# Patient Record
Sex: MALE | Race: WHITE | NOT HISPANIC OR LATINO | Employment: FULL TIME | URBAN - METROPOLITAN AREA
[De-identification: names, ages, dates, MRNs, and addresses within clinical notes are randomized per-mention and may not be internally consistent; named-entity substitution may affect disease eponyms.]

---

## 2019-08-28 ENCOUNTER — OFFICE VISIT (OUTPATIENT)
Dept: FAMILY MEDICINE CLINIC | Facility: CLINIC | Age: 39
End: 2019-08-28

## 2019-08-28 VITALS
WEIGHT: 187 LBS | DIASTOLIC BLOOD PRESSURE: 84 MMHG | HEART RATE: 63 BPM | BODY MASS INDEX: 24.78 KG/M2 | SYSTOLIC BLOOD PRESSURE: 123 MMHG | HEIGHT: 73 IN | TEMPERATURE: 98.2 F | OXYGEN SATURATION: 97 %

## 2019-08-28 DIAGNOSIS — M25.512 ACUTE PAIN OF LEFT SHOULDER: Primary | ICD-10-CM

## 2019-08-28 DIAGNOSIS — F32.A DEPRESSION, UNSPECIFIED DEPRESSION TYPE: ICD-10-CM

## 2019-08-28 DIAGNOSIS — J45.30 MILD PERSISTENT ASTHMA WITHOUT COMPLICATION: ICD-10-CM

## 2019-08-28 DIAGNOSIS — G25.81 RESTLESS LEG SYNDROME: ICD-10-CM

## 2019-08-28 PROBLEM — J45.909 ASTHMA: Status: ACTIVE | Noted: 2019-08-28

## 2019-08-28 PROCEDURE — 99214 OFFICE O/P EST MOD 30 MIN: CPT | Performed by: FAMILY MEDICINE

## 2019-08-28 RX ORDER — ESCITALOPRAM OXALATE 20 MG/1
20 TABLET ORAL DAILY
Qty: 90 TABLET | Refills: 3 | Status: SHIPPED | OUTPATIENT
Start: 2019-08-28 | End: 2020-09-03 | Stop reason: SDUPTHER

## 2019-08-28 RX ORDER — MONTELUKAST SODIUM 10 MG/1
10 TABLET ORAL NIGHTLY
Qty: 90 TABLET | Refills: 3 | Status: SHIPPED | OUTPATIENT
Start: 2019-08-28 | End: 2020-09-14 | Stop reason: SDUPTHER

## 2019-08-28 RX ORDER — BUPROPION HYDROCHLORIDE 150 MG/1
TABLET ORAL
Qty: 90 TABLET | Refills: 3 | Status: SHIPPED | OUTPATIENT
Start: 2019-08-28 | End: 2020-09-17 | Stop reason: SDUPTHER

## 2019-08-28 RX ORDER — PRAMIPEXOLE DIHYDROCHLORIDE 0.25 MG/1
TABLET ORAL
Qty: 120 TABLET | Refills: 1 | Status: SHIPPED | OUTPATIENT
Start: 2019-08-28 | End: 2020-09-17 | Stop reason: SDUPTHER

## 2019-08-28 NOTE — PROGRESS NOTES
Subjective   Angel Chandra is a 38 y.o. male.     Chief Complaint   Patient presents with   • Asthma   • Depression   • Pain       History of Present Illness     Patient is here to follow-up on depression.  Recently he decreased his bupropion to 150 mg daily but has continued the Lexapro at 20 mg a day.  He is getting the same benefit with the lower dose of bupropion and would like to switch from the 300s to the 150s.  Denies SI or HI.     Patient is also here to follow-up on asthma.  His current symptoms have been poorly controlled past week.  He had one day where he woke up short of breath and had to miss work but with his regular routine medications he was able to gain good symptom relief by about noon that day.  Patient did state that he had not been taking the Advair twice daily but only once daily.    Patient is also here to follow-up on his restless leg syndrome.  He takes Mirapex as needed.  This gives him good symptom control.    Also here because he is having a new problem.  He has been having left shoulder pain for the past 2 years intermittently.  However more recently it has been getting worse especially after golfing or other activities.  Patient has had some symptom relief with muscle relaxers, NSAIDs, and ice.  However his symptoms seem to be worsening.  He had a similar problem in the right shoulder which had to be surgically repaired in 2011.  Patient used to be a  and also played basketball in the past.  Right-hand-dominant.  Denies numbness or tingling down the arm or in the hand.      Review of Systems   Constitutional: Negative for activity change, chills, fatigue and fever.   HENT: Negative for hearing loss, swollen glands, tinnitus and trouble swallowing.    Eyes: Negative for pain and visual disturbance.   Respiratory: Negative for cough and shortness of breath.    Cardiovascular: Negative for chest pain, palpitations and leg swelling.   Gastrointestinal: Negative for diarrhea and  nausea.   Endocrine: Negative for polydipsia and polyuria.   Genitourinary: Negative for difficulty urinating and urinary incontinence.   Musculoskeletal: Positive for arthralgias. Negative for gait problem and joint swelling.   Skin: Negative for rash.   Allergic/Immunologic: Negative for immunocompromised state.   Neurological: Negative for dizziness, light-headedness and headache.   Hematological: Negative for adenopathy. Does not bruise/bleed easily.   Psychiatric/Behavioral: Negative for dysphoric mood and sleep disturbance.       The following portions of the patient's history were reviewed and updated as appropriate: allergies, current medications, past family history, past medical history, past social history, past surgical history and problem list.    Past Medical History:   Diagnosis Date   • Asthma    • Depression    • Restless leg syndrome        History reviewed. No pertinent surgical history.    Family History   Problem Relation Age of Onset   • No Known Problems Mother    • Hypertension Father    • Coronary artery disease Paternal Grandfather    • Alzheimer's disease Paternal Grandfather        Social History     Socioeconomic History   • Marital status:      Spouse name: Not on file   • Number of children: Not on file   • Years of education: Not on file   • Highest education level: Not on file   Tobacco Use   • Smoking status: Never Smoker   • Smokeless tobacco: Never Used   Substance and Sexual Activity   • Alcohol use: Yes     Alcohol/week: 0.6 oz     Types: 1 Standard drinks or equivalent per week   • Drug use: No         Current Outpatient Medications:   •  ADVAIR DISKUS 250-50 MCG/DOSE DISKUS, Inhale 1 puff 2 (Two) Times a Day., Disp: 60 each, Rfl: 6  •  buPROPion XL (WELLBUTRIN XL) 150 MG 24 hr tablet, 1 po daily, Disp: 90 tablet, Rfl: 3  •  cyclobenzaprine (FLEXERIL) 10 MG tablet, 1 every 8 hours as needed. (Patient taking differently: 1 (One) Time As Needed. 1 every 8 hours as  "needed.), Disp: 90 tablet, Rfl: 0  •  escitalopram (LEXAPRO) 20 MG tablet, Take 1 tablet by mouth Daily., Disp: 90 tablet, Rfl: 3  •  montelukast (SINGULAIR) 10 MG tablet, Take 1 tablet by mouth Every Night., Disp: 90 tablet, Rfl: 3  •  pramipexole (MIRAPEX) 0.25 MG tablet, Take 1-2 every evening as needed, Disp: 120 tablet, Rfl: 1    Objective     Vitals:    08/28/19 1612   BP: 123/84   Pulse: 63   Temp: 98.2 °F (36.8 °C)   SpO2: 97%   Weight: 84.8 kg (187 lb)   Height: 185.4 cm (73\")       Body mass index is 24.67 kg/m².    Physical Exam   Constitutional: He is oriented to person, place, and time. He appears well-developed and well-nourished.   HENT:   Head: Normocephalic and atraumatic.   Eyes: Conjunctivae are normal.   Neck: Normal range of motion. Neck supple.   Cardiovascular: Normal rate, regular rhythm, normal heart sounds and intact distal pulses.   Pulmonary/Chest: Effort normal and breath sounds normal.   Abdominal: Soft. Bowel sounds are normal.   Musculoskeletal: Normal range of motion. He exhibits tenderness (Tenderness to palpation in the anterior portion of the left deltoid). He exhibits no edema or deformity.   Movements that bring about the left shoulder pain are reaching behind the patient's back, doing push-ups, or doing pull-ups.  Normal  strength.   Neurological: He is alert and oriented to person, place, and time.   Skin: Skin is warm and dry. Capillary refill takes less than 2 seconds. No rash noted.   Psychiatric: He has a normal mood and affect. His behavior is normal. Judgment and thought content normal.   Nursing note and vitals reviewed.      Procedures    Assessment/Plan   Angel was seen today for asthma, depression and pain.    Diagnoses and all orders for this visit:    Acute pain of left shoulder  -     Ambulatory Referral to Orthopedic Surgery    Mild persistent asthma without complication  -     ADVAIR DISKUS 250-50 MCG/DOSE DISKUS; Inhale 1 puff 2 (Two) Times a Day.  -     " montelukast (SINGULAIR) 10 MG tablet; Take 1 tablet by mouth Every Night.    Restless leg syndrome  -     pramipexole (MIRAPEX) 0.25 MG tablet; Take 1-2 every evening as needed    Depression, unspecified depression type  -     buPROPion XL (WELLBUTRIN XL) 150 MG 24 hr tablet; 1 po daily  -     escitalopram (LEXAPRO) 20 MG tablet; Take 1 tablet by mouth Daily.        Patient Instructions   I have decreased the patient's bupropion to 150 mg daily at his request.  Continue the same dose of Lexapro.  He was advised to return the office in 1 year or sooner as needed for depression.  Patient will continue Advair and montelukast for his asthma.  I encouraged him to use the Advair as directed until his current estimation of asthma is under good control.  Patient will be referred to Dr. Cortes for the evaluation of his left shoulder pain

## 2019-08-28 NOTE — PATIENT INSTRUCTIONS
I have decreased the patient's bupropion to 150 mg daily at his request.  Continue the same dose of Lexapro.  He was advised to return the office in 1 year or sooner as needed for depression.  Patient will continue Advair and montelukast for his asthma.  I encouraged him to use the Advair as directed until his current estimation of asthma is under good control.  Patient will be referred to Dr. Cortes for the evaluation of his left shoulder pain

## 2020-09-03 DIAGNOSIS — J45.30 MILD PERSISTENT ASTHMA WITHOUT COMPLICATION: ICD-10-CM

## 2020-09-03 DIAGNOSIS — F32.A DEPRESSION, UNSPECIFIED DEPRESSION TYPE: ICD-10-CM

## 2020-09-03 RX ORDER — ESCITALOPRAM OXALATE 20 MG/1
20 TABLET ORAL DAILY
Qty: 30 TABLET | Refills: 0 | Status: SHIPPED | OUTPATIENT
Start: 2020-09-03 | End: 2020-09-17 | Stop reason: SDUPTHER

## 2020-09-14 DIAGNOSIS — J45.30 MILD PERSISTENT ASTHMA WITHOUT COMPLICATION: ICD-10-CM

## 2020-09-14 RX ORDER — MONTELUKAST SODIUM 10 MG/1
10 TABLET ORAL NIGHTLY
Qty: 90 TABLET | Refills: 0 | Status: SHIPPED | OUTPATIENT
Start: 2020-09-14 | End: 2020-09-17 | Stop reason: SDUPTHER

## 2020-09-17 ENCOUNTER — OFFICE VISIT (OUTPATIENT)
Dept: FAMILY MEDICINE CLINIC | Facility: CLINIC | Age: 40
End: 2020-09-17

## 2020-09-17 VITALS
BODY MASS INDEX: 24.7 KG/M2 | HEART RATE: 81 BPM | WEIGHT: 186.4 LBS | OXYGEN SATURATION: 98 % | DIASTOLIC BLOOD PRESSURE: 80 MMHG | TEMPERATURE: 97.3 F | HEIGHT: 73 IN | SYSTOLIC BLOOD PRESSURE: 120 MMHG

## 2020-09-17 DIAGNOSIS — F32.A DEPRESSION, UNSPECIFIED DEPRESSION TYPE: ICD-10-CM

## 2020-09-17 DIAGNOSIS — G25.81 RESTLESS LEG SYNDROME: Primary | ICD-10-CM

## 2020-09-17 DIAGNOSIS — J45.30 MILD PERSISTENT ASTHMA WITHOUT COMPLICATION: ICD-10-CM

## 2020-09-17 PROCEDURE — 99214 OFFICE O/P EST MOD 30 MIN: CPT | Performed by: FAMILY MEDICINE

## 2020-09-17 RX ORDER — PRAMIPEXOLE DIHYDROCHLORIDE 0.25 MG/1
TABLET ORAL
Qty: 120 TABLET | Refills: 1 | Status: SHIPPED | OUTPATIENT
Start: 2020-09-17 | End: 2020-12-17

## 2020-09-17 RX ORDER — ESCITALOPRAM OXALATE 20 MG/1
20 TABLET ORAL DAILY
Qty: 90 TABLET | Refills: 3 | Status: SHIPPED | OUTPATIENT
Start: 2020-09-17 | End: 2021-04-07 | Stop reason: SDUPTHER

## 2020-09-17 RX ORDER — MONTELUKAST SODIUM 10 MG/1
10 TABLET ORAL NIGHTLY
Qty: 90 TABLET | Refills: 1 | Status: SHIPPED | OUTPATIENT
Start: 2020-09-17 | End: 2021-04-07 | Stop reason: SDUPTHER

## 2020-09-17 RX ORDER — BUPROPION HYDROCHLORIDE 150 MG/1
TABLET ORAL
Qty: 90 TABLET | Refills: 3 | Status: SHIPPED | OUTPATIENT
Start: 2020-09-17 | End: 2021-04-07 | Stop reason: SDUPTHER

## 2020-09-17 NOTE — PROGRESS NOTES
Subjective  Answers for HPI/ROS submitted by the patient on 9/15/2020   What is the primary reason for your visit?: Other  Please describe your symptoms.: Just checkup.  Have you had these symptoms before?: Yes  How long have you been having these symptoms?: 1-4 days    Angel Chandra is a 40 y.o. male.     Chief Complaint   Patient presents with   • med check       Patient is here to follow-up on depression.  Recently he decreased his bupropion to 150 mg daily but has continued the Lexapro at 20 mg a day.  He is getting the same benefit with the lower dose of bupropion and would like to switch from the 300s to the 150s.  Denies SI or HI.     Patient is also here to follow-up on asthma.  His current symptoms have been poorly controlled past week.  He had one day where he woke up short of breath and had to miss work but with his regular routine medications he was able to gain good symptom relief by about noon that day.  Patient did state that he had not been taking the Advair twice daily but only once daily.    Patient is also here to follow-up on his restless leg syndrome.  He takes Mirapex as needed.  This gives him good symptom control.       Review of Systems   Constitutional: Negative for activity change, chills, fatigue and fever.   HENT: Negative for hearing loss, swollen glands, tinnitus and trouble swallowing.    Eyes: Negative for pain and visual disturbance.   Respiratory: Negative for cough and shortness of breath.    Cardiovascular: Negative for chest pain, palpitations and leg swelling.   Gastrointestinal: Negative for diarrhea and nausea.   Endocrine: Negative for polydipsia and polyuria.   Genitourinary: Negative for difficulty urinating and urinary incontinence.   Musculoskeletal: Negative for arthralgias, gait problem and joint swelling.   Skin: Negative for rash.   Allergic/Immunologic: Negative for immunocompromised state.   Neurological: Negative for dizziness, light-headedness and headache.    Hematological: Negative for adenopathy. Does not bruise/bleed easily.   Psychiatric/Behavioral: Negative for dysphoric mood and sleep disturbance.       The following portions of the patient's history were reviewed and updated as appropriate: allergies, current medications, past family history, past medical history, past social history, past surgical history and problem list.    Past Medical History:   Diagnosis Date   • Asthma    • Depression    • Restless leg syndrome        History reviewed. No pertinent surgical history.    Family History   Problem Relation Age of Onset   • No Known Problems Mother    • Hypertension Father    • Coronary artery disease Paternal Grandfather    • Alzheimer's disease Paternal Grandfather        Social History     Socioeconomic History   • Marital status:      Spouse name: Not on file   • Number of children: Not on file   • Years of education: Not on file   • Highest education level: Not on file   Tobacco Use   • Smoking status: Never Smoker   • Smokeless tobacco: Never Used   Substance and Sexual Activity   • Alcohol use: Yes     Alcohol/week: 1.0 standard drinks     Types: 1 Standard drinks or equivalent per week   • Drug use: No         Current Outpatient Medications:   •  Advair Diskus 250-50 MCG/DOSE DISKUS, Inhale 1 puff 2 (Two) Times a Day., Disp: 60 each, Rfl: 5  •  buPROPion XL (WELLBUTRIN XL) 150 MG 24 hr tablet, 1 po daily, Disp: 90 tablet, Rfl: 3  •  cyclobenzaprine (FLEXERIL) 10 MG tablet, 1 every 8 hours as needed. (Patient taking differently: 1 (One) Time As Needed. 1 every 8 hours as needed.), Disp: 90 tablet, Rfl: 0  •  escitalopram (LEXAPRO) 20 MG tablet, Take 1 tablet by mouth Daily., Disp: 90 tablet, Rfl: 3  •  montelukast (SINGULAIR) 10 MG tablet, Take 1 tablet by mouth Every Night., Disp: 90 tablet, Rfl: 1  •  pramipexole (MIRAPEX) 0.25 MG tablet, Take 1-2 every evening as needed, Disp: 120 tablet, Rfl: 1    Objective     Vitals:    09/17/20 1258   BP:  "120/80   Pulse: 81   Temp: 97.3 °F (36.3 °C)   SpO2: 98%   Weight: 84.6 kg (186 lb 6.4 oz)   Height: 185.4 cm (73\")       Body mass index is 24.59 kg/m².    No components found for: 2D    Physical Exam  Vitals signs and nursing note reviewed.   Constitutional:       Appearance: He is well-developed.   HENT:      Head: Normocephalic and atraumatic.      Right Ear: External ear normal.      Left Ear: External ear normal.      Nose: Nose normal.   Eyes:      General: No scleral icterus.     Conjunctiva/sclera: Conjunctivae normal.   Neck:      Musculoskeletal: Normal range of motion and neck supple.      Vascular: No JVD.   Cardiovascular:      Rate and Rhythm: Normal rate and regular rhythm.      Pulses: Normal pulses.      Heart sounds: Normal heart sounds. No murmur.   Pulmonary:      Effort: Pulmonary effort is normal.      Breath sounds: Normal breath sounds.   Abdominal:      General: Bowel sounds are normal.      Palpations: Abdomen is soft.   Musculoskeletal: Normal range of motion.   Lymphadenopathy:      Cervical: No cervical adenopathy.   Skin:     General: Skin is warm.      Capillary Refill: Capillary refill takes less than 2 seconds.   Neurological:      General: No focal deficit present.      Mental Status: He is alert and oriented to person, place, and time.   Psychiatric:         Behavior: Behavior normal.         Thought Content: Thought content normal.         Judgment: Judgment normal.       Procedures    Assessment/Plan   Angel was seen today for med check.    Diagnoses and all orders for this visit:    Restless leg syndrome  -     pramipexole (MIRAPEX) 0.25 MG tablet; Take 1-2 every evening as needed    Depression, unspecified depression type  -     buPROPion XL (WELLBUTRIN XL) 150 MG 24 hr tablet; 1 po daily  -     escitalopram (LEXAPRO) 20 MG tablet; Take 1 tablet by mouth Daily.    Mild persistent asthma without complication  -     Advair Diskus 250-50 MCG/DOSE DISKUS; Inhale 1 puff 2 (Two) " Times a Day.  -     montelukast (SINGULAIR) 10 MG tablet; Take 1 tablet by mouth Every Night.        There are no Patient Instructions on file for this visit.

## 2020-10-02 ENCOUNTER — TELEPHONE (OUTPATIENT)
Dept: FAMILY MEDICINE CLINIC | Facility: CLINIC | Age: 40
End: 2020-10-02

## 2020-10-02 NOTE — TELEPHONE ENCOUNTER
Angel Dao is not considered “high risk” in the current CDC classification for people who are at “high risk” of complications of COVID 19. Moderate and Severe cases of Asthma are but his classification is MILD persistent asthma which is not currently classified as “high risk”

## 2020-10-02 NOTE — TELEPHONE ENCOUNTER
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 HE WANTS TO KNOW IF HE IS HIGH RISK WITH ASTHMA  AND HIS  HISTORY OF PNEUMONIA WHICH HE HAS HAD 6 TIMES.    PLEASE CALL AND ADVISE [phone]

## 2020-12-17 DIAGNOSIS — G25.81 RESTLESS LEG SYNDROME: ICD-10-CM

## 2020-12-17 RX ORDER — PRAMIPEXOLE DIHYDROCHLORIDE 0.25 MG/1
TABLET ORAL
Qty: 180 TABLET | Refills: 0 | Status: SHIPPED | OUTPATIENT
Start: 2020-12-17 | End: 2021-03-16

## 2021-03-16 DIAGNOSIS — G25.81 RESTLESS LEG SYNDROME: ICD-10-CM

## 2021-03-16 RX ORDER — PRAMIPEXOLE DIHYDROCHLORIDE 0.25 MG/1
TABLET ORAL
Qty: 180 TABLET | Refills: 0 | Status: SHIPPED | OUTPATIENT
Start: 2021-03-16 | End: 2022-07-25

## 2021-04-02 ENCOUNTER — BULK ORDERING (OUTPATIENT)
Dept: CASE MANAGEMENT | Facility: OTHER | Age: 41
End: 2021-04-02

## 2021-04-02 DIAGNOSIS — Z23 IMMUNIZATION DUE: ICD-10-CM

## 2021-04-07 ENCOUNTER — OFFICE VISIT (OUTPATIENT)
Dept: FAMILY MEDICINE CLINIC | Facility: CLINIC | Age: 41
End: 2021-04-07

## 2021-04-07 VITALS
BODY MASS INDEX: 25.21 KG/M2 | HEIGHT: 73 IN | TEMPERATURE: 98.2 F | SYSTOLIC BLOOD PRESSURE: 118 MMHG | HEART RATE: 60 BPM | OXYGEN SATURATION: 98 % | DIASTOLIC BLOOD PRESSURE: 70 MMHG | WEIGHT: 190.2 LBS

## 2021-04-07 DIAGNOSIS — J45.30 MILD PERSISTENT ASTHMA WITHOUT COMPLICATION: Chronic | ICD-10-CM

## 2021-04-07 DIAGNOSIS — G25.81 RESTLESS LEG SYNDROME: Chronic | ICD-10-CM

## 2021-04-07 DIAGNOSIS — Z13.6 ENCOUNTER FOR LIPID SCREENING FOR CARDIOVASCULAR DISEASE: ICD-10-CM

## 2021-04-07 DIAGNOSIS — Z13.1 ENCOUNTER FOR SCREENING FOR DIABETES MELLITUS: ICD-10-CM

## 2021-04-07 DIAGNOSIS — F32.A DEPRESSION, UNSPECIFIED DEPRESSION TYPE: Primary | Chronic | ICD-10-CM

## 2021-04-07 DIAGNOSIS — Z00.00 ENCOUNTER FOR WELLNESS EXAMINATION IN ADULT: ICD-10-CM

## 2021-04-07 DIAGNOSIS — Z13.220 ENCOUNTER FOR LIPID SCREENING FOR CARDIOVASCULAR DISEASE: ICD-10-CM

## 2021-04-07 PROCEDURE — 99396 PREV VISIT EST AGE 40-64: CPT | Performed by: FAMILY MEDICINE

## 2021-04-07 RX ORDER — LORATADINE 10 MG/1
10 TABLET ORAL DAILY
COMMUNITY

## 2021-04-07 RX ORDER — ESCITALOPRAM OXALATE 20 MG/1
20 TABLET ORAL DAILY
Qty: 90 TABLET | Refills: 3 | Status: SHIPPED | OUTPATIENT
Start: 2021-04-07 | End: 2022-02-09 | Stop reason: SDUPTHER

## 2021-04-07 RX ORDER — MONTELUKAST SODIUM 10 MG/1
10 TABLET ORAL NIGHTLY
Qty: 90 TABLET | Refills: 1 | Status: SHIPPED | OUTPATIENT
Start: 2021-04-07 | End: 2021-11-01

## 2021-04-07 RX ORDER — BUPROPION HYDROCHLORIDE 150 MG/1
TABLET ORAL
Qty: 90 TABLET | Refills: 3 | Status: SHIPPED | OUTPATIENT
Start: 2021-04-07 | End: 2022-02-09

## 2021-04-07 NOTE — PROGRESS NOTES
Subjective   Angel Chandra is a 40 y.o. male who presents for annual male wellness exam.  Chief Complaint   Patient presents with   • Annual Exam      Patient is also here to follow-up on depression.  Recently he decreased his bupropion to 150 mg daily but has continued the Lexapro at 20 mg a day.  He feels his mood is sometimes flat but otherwise he feels it is better than when he is not taking the meds. Denies SI or HI.     Patient is also here to follow-up on asthma.  Patient is taking Advair twice daily now.  He is also taking montelukast 10 mg daily and loratadine for seasonal allergy symptoms that tend to promote his asthma symptoms.    Patient is also here to follow-up on his restless leg syndrome.  He takes Mirapex as needed.  This gives him good symptom control.    Sexual History: Monogamous female partner for the past 20 years  Contraception: Partner with IUD  Diet: Healthy  Exercise: Not really exercising regularily  Do you feel safe? Yes  Have you ever been abused? No  Last dental exam: 6 months ago  Eye exam: 1 year ago    Colon Cancer Screening: NA  PSA: NA        There is no immunization history on file for this patient.    The following portions of the patient's history were reviewed and updated as appropriate: allergies, current medications, past family history, past medical history, past social history, past surgical history and problem list.    Past Medical History:   Diagnosis Date   • Asthma    • Depression    • Restless leg syndrome        History reviewed. No pertinent surgical history.    Family History   Problem Relation Age of Onset   • No Known Problems Mother    • Hypertension Father    • Coronary artery disease Paternal Grandfather    • Alzheimer's disease Paternal Grandfather        Social History     Socioeconomic History   • Marital status:      Spouse name: Not on file   • Number of children: Not on file   • Years of education: Not on file   • Highest education level: Not on  file   Tobacco Use   • Smoking status: Never Smoker   • Smokeless tobacco: Never Used   Substance and Sexual Activity   • Alcohol use: Yes     Alcohol/week: 1.0 standard drinks     Types: 1 Standard drinks or equivalent per week   • Drug use: No         Current Outpatient Medications:   •  Advair Diskus 250-50 MCG/DOSE DISKUS, Inhale 1 puff 2 (Two) Times a Day., Disp: 60 each, Rfl: 5  •  buPROPion XL (WELLBUTRIN XL) 150 MG 24 hr tablet, 1 po daily, Disp: 90 tablet, Rfl: 3  •  cyclobenzaprine (FLEXERIL) 10 MG tablet, 1 every 8 hours as needed. (Patient taking differently: 1 (One) Time As Needed. 1 every 8 hours as needed.), Disp: 90 tablet, Rfl: 0  •  escitalopram (LEXAPRO) 20 MG tablet, Take 1 tablet by mouth Daily., Disp: 90 tablet, Rfl: 3  •  loratadine (CLARITIN) 10 MG tablet, Take 10 mg by mouth Daily., Disp: , Rfl:   •  montelukast (SINGULAIR) 10 MG tablet, Take 1 tablet by mouth Every Night., Disp: 90 tablet, Rfl: 1  •  pramipexole (MIRAPEX) 0.25 MG tablet, TAKE 1-2 EVERY EVENING AS NEEDED, Disp: 180 tablet, Rfl: 0    Review of Systems    Objective   Vitals:    04/07/21 1426   BP: 118/70   Pulse: 60   Temp: 98.2 °F (36.8 °C)   SpO2: 98%     Body mass index is 25.1 kg/m².  Physical Exam  Vitals and nursing note reviewed.   Constitutional:       Appearance: Normal appearance. He is well-developed.   HENT:      Head: Normocephalic and atraumatic.   Eyes:      General: No scleral icterus.     Conjunctiva/sclera: Conjunctivae normal.   Cardiovascular:      Rate and Rhythm: Normal rate and regular rhythm.      Heart sounds: Normal heart sounds.   Pulmonary:      Effort: Pulmonary effort is normal.      Breath sounds: Normal breath sounds.   Abdominal:      General: Bowel sounds are normal.      Palpations: Abdomen is soft.   Musculoskeletal:         General: Normal range of motion.      Cervical back: Normal range of motion and neck supple.   Skin:     General: Skin is warm and dry.      Capillary Refill: Capillary  refill takes less than 2 seconds.      Findings: No rash.   Neurological:      General: No focal deficit present.      Mental Status: He is alert and oriented to person, place, and time.   Psychiatric:         Mood and Affect: Mood normal.         Behavior: Behavior normal.         Thought Content: Thought content normal.         Judgment: Judgment normal.           Assessment/Plan   Diagnoses and all orders for this visit:    1. Depression, unspecified depression type (Primary)  -     buPROPion XL (WELLBUTRIN XL) 150 MG 24 hr tablet; 1 po daily  Dispense: 90 tablet; Refill: 3  -     escitalopram (LEXAPRO) 20 MG tablet; Take 1 tablet by mouth Daily.  Dispense: 90 tablet; Refill: 3    2. Restless leg syndrome    3. Mild persistent asthma without complication  -     Discontinue: Advair Diskus 250-50 MCG/DOSE DISKUS; Inhale 1 puff 2 (Two) Times a Day.  Dispense: 60 each; Refill: 5  -     Advair Diskus 250-50 MCG/DOSE DISKUS; Inhale 1 puff 2 (Two) Times a Day.  Dispense: 60 each; Refill: 5  -     montelukast (SINGULAIR) 10 MG tablet; Take 1 tablet by mouth Every Night.  Dispense: 90 tablet; Refill: 1    4. Encounter for wellness examination in adult    5. Encounter for lipid screening for cardiovascular disease  -     Lipid Panel    6. Encounter for screening for diabetes mellitus  -     Comprehensive Metabolic Panel        Discussed the importance of maintaining a healthy weight and getting regular exercise.  Educated patient on the benefits of healthy diet.  Advise follow-up annually for wellness exams.    There are no Patient Instructions on file for this visit.

## 2021-04-08 LAB
ALBUMIN SERPL-MCNC: 4.7 G/DL (ref 4–5)
ALBUMIN/GLOB SERPL: 2 {RATIO} (ref 1.2–2.2)
ALP SERPL-CCNC: 67 IU/L (ref 39–117)
ALT SERPL-CCNC: 28 IU/L (ref 0–44)
AST SERPL-CCNC: 19 IU/L (ref 0–40)
BILIRUB SERPL-MCNC: 0.5 MG/DL (ref 0–1.2)
BUN SERPL-MCNC: 14 MG/DL (ref 6–24)
BUN/CREAT SERPL: 12 (ref 9–20)
CALCIUM SERPL-MCNC: 9.4 MG/DL (ref 8.7–10.2)
CHLORIDE SERPL-SCNC: 103 MMOL/L (ref 96–106)
CHOLEST SERPL-MCNC: 288 MG/DL (ref 100–199)
CO2 SERPL-SCNC: 22 MMOL/L (ref 20–29)
CREAT SERPL-MCNC: 1.14 MG/DL (ref 0.76–1.27)
GLOBULIN SER CALC-MCNC: 2.4 G/DL (ref 1.5–4.5)
GLUCOSE SERPL-MCNC: 84 MG/DL (ref 65–99)
HDLC SERPL-MCNC: 37 MG/DL
LDLC SERPL CALC-MCNC: 197 MG/DL (ref 0–99)
POTASSIUM SERPL-SCNC: 4.7 MMOL/L (ref 3.5–5.2)
PROT SERPL-MCNC: 7.1 G/DL (ref 6–8.5)
SODIUM SERPL-SCNC: 140 MMOL/L (ref 134–144)
TRIGL SERPL-MCNC: 272 MG/DL (ref 0–149)
VLDLC SERPL CALC-MCNC: 54 MG/DL (ref 5–40)

## 2021-08-18 ENCOUNTER — TELEPHONE (OUTPATIENT)
Dept: FAMILY MEDICINE CLINIC | Facility: CLINIC | Age: 41
End: 2021-08-18

## 2021-08-18 NOTE — TELEPHONE ENCOUNTER
I called pt back and told him we usually advise OTC meds for pain ibuprofen/tylenol, decongestants if needed, drink plenty of water. If any chest pain or soa go to ER. If you want me to add anything let me know and I can call him back

## 2021-08-18 NOTE — TELEPHONE ENCOUNTER
Caller: Prateek Angel    Relationship: Self    Best call back number: [phone]     What medication are you requesting: COVID POSITIVE PATIENT- SOMETHING TO HELP WITH SYMPTOMS     What are your current symptoms: BAD NASAL CONGESTION, TERRIBLE BODY AND MUSCLE ACHES     How long have you been experiencing symptoms: JUST STARTED TODAY     Have you had these symptoms before:  [] Yes  [x] No    Have you been treated for these symptoms before:  [] Yes  [x] No    If a prescription is needed, what is your preferred pharmacy and phone number:        [facility]/pharmacy #[id number] - [place], [place] - [address] [phone] [facility] [phone]   [phone]    Additional notes:    PATIENT WOKE UP THIS MORNING FEELING TERRIBLE. HE WORKS IN "[employer] AND ENDED UP GETTING TESTED FOR COVID THIS MORNING AT THE SCHOOL. HE IS POSITIVE.   HE IS OUT OF WORK TIL AUGUST 30 TH.

## 2021-10-31 DIAGNOSIS — J45.30 MILD PERSISTENT ASTHMA WITHOUT COMPLICATION: Chronic | ICD-10-CM

## 2021-11-01 RX ORDER — MONTELUKAST SODIUM 10 MG/1
TABLET ORAL
Qty: 90 TABLET | Refills: 1 | Status: SHIPPED | OUTPATIENT
Start: 2021-11-01 | End: 2022-05-06

## 2022-02-09 ENCOUNTER — OFFICE VISIT (OUTPATIENT)
Dept: FAMILY MEDICINE CLINIC | Facility: CLINIC | Age: 42
End: 2022-02-09

## 2022-02-09 VITALS
WEIGHT: 189.2 LBS | HEART RATE: 67 BPM | DIASTOLIC BLOOD PRESSURE: 72 MMHG | HEIGHT: 73 IN | BODY MASS INDEX: 25.08 KG/M2 | TEMPERATURE: 97.7 F | OXYGEN SATURATION: 97 % | SYSTOLIC BLOOD PRESSURE: 108 MMHG

## 2022-02-09 DIAGNOSIS — F43.23 ADJUSTMENT DISORDER WITH MIXED ANXIETY AND DEPRESSED MOOD: Primary | ICD-10-CM

## 2022-02-09 DIAGNOSIS — F41.0 PANIC ATTACKS: ICD-10-CM

## 2022-02-09 DIAGNOSIS — F32.A DEPRESSION, UNSPECIFIED DEPRESSION TYPE: Chronic | ICD-10-CM

## 2022-02-09 PROCEDURE — 99214 OFFICE O/P EST MOD 30 MIN: CPT | Performed by: FAMILY MEDICINE

## 2022-02-09 RX ORDER — ESCITALOPRAM OXALATE 20 MG/1
20 TABLET ORAL DAILY
Qty: 90 TABLET | Refills: 1 | Status: SHIPPED | OUTPATIENT
Start: 2022-02-09 | End: 2022-06-03 | Stop reason: SDUPTHER

## 2022-02-09 RX ORDER — HYDROXYZINE HYDROCHLORIDE 25 MG/1
25 TABLET, FILM COATED ORAL EVERY 8 HOURS PRN
Qty: 30 TABLET | Refills: 0 | Status: SHIPPED | OUTPATIENT
Start: 2022-02-09

## 2022-02-09 NOTE — PROGRESS NOTES
"Chief Complaint  Insomnia (worried he has pneumonia because wife has it. )    Subjective          Angel Chandra presents to [facility]  Patient is here today to discuss any symptoms he is having.  He recently had an upper respiratory infection a few weeks ago and then his wife tested positive for COVID after that.  He never was tested.  He did improve with his upper respiratory symptoms but has had issues with insomnia for about 3 weeks.  He stopped his antidepressants about 10 weeks ago.  He states that he is feeling dizzy intermittently.  He still has an intermittent cough.  Some episodes of shortness of breath.  He does have a history of panic attacks and admits that some of this could be anxiety.  Patient denies any chest pain or palpitations other than when he gets very anxious he does feel his heart racing.      Objective   Vital Signs:   /72   Pulse 67   Temp 97.7 °F (36.5 °C)   Ht 185.4 cm (72.99\")   Wt 85.8 kg (189 lb 3.2 oz)   SpO2 97%   BMI 24.97 kg/m²     Physical Exam  Vitals and nursing note reviewed.   Constitutional:       Appearance: Normal appearance. He is well-developed and normal weight.   HENT:      Head: Normocephalic and atraumatic.   Eyes:      General: No scleral icterus.     Conjunctiva/sclera: Conjunctivae normal.   Cardiovascular:      Rate and Rhythm: Normal rate and regular rhythm.      Heart sounds: Normal heart sounds.   Pulmonary:      Effort: Pulmonary effort is normal.      Breath sounds: Normal breath sounds. No wheezing, rhonchi or rales.   Musculoskeletal:         General: Normal range of motion.      Cervical back: Normal range of motion and neck supple.      Right lower leg: No edema.      Left lower leg: No edema.   Skin:     General: Skin is warm and dry.      Capillary Refill: Capillary refill takes less than 2 seconds.      Findings: No rash.   Neurological:      Mental Status: He is alert and oriented to person, place, and time. "   Psychiatric:         Mood and Affect: Mood normal.         Behavior: Behavior normal.         Thought Content: Thought content normal.         Judgment: Judgment normal.        Result Review :                 Assessment and Plan    Diagnoses and all orders for this visit:    1. Adjustment disorder with mixed anxiety and depressed mood (Primary)    2. Panic attacks  -     hydrOXYzine (ATARAX) 25 MG tablet; Take 1 tablet by mouth Every 8 (Eight) Hours As Needed for Anxiety.  Dispense: 30 tablet; Refill: 0    3. Depression, unspecified depression type  -     escitalopram (LEXAPRO) 20 MG tablet; Take 1 tablet by mouth Daily.  Dispense: 90 tablet; Refill: 1    Clinically the patient's vitals are all stable and his physical exam does not support a diagnosis of pneumonia.  His symptoms seem much more in keeping with uncontrolled anxiety and panic disorder.  I would like him to restart Lexapro at 10 mg daily and use hydroxyzine as needed for sleep or panic attacks.  He should stay at that dosage for at least a week and he may increase it to a 20 mg dose after that if needed.  If his symptoms are worsening or if he develops any new symptoms he should seek more urgent medical care.  Otherwise he should follow-up in 3 months or sooner for persistent symptoms.      Follow Up   Return in about 3 months (around 5/9/2022) for Anxiety.  Patient was given instructions and counseling regarding his condition or for health maintenance advice. Please see specific information pulled into the AVS if appropriate.

## 2022-04-15 ENCOUNTER — OFFICE VISIT (OUTPATIENT)
Dept: FAMILY MEDICINE CLINIC | Facility: CLINIC | Age: 42
End: 2022-04-15

## 2022-04-15 VITALS
DIASTOLIC BLOOD PRESSURE: 62 MMHG | OXYGEN SATURATION: 97 % | HEART RATE: 78 BPM | SYSTOLIC BLOOD PRESSURE: 116 MMHG | BODY MASS INDEX: 24.65 KG/M2 | WEIGHT: 186 LBS | HEIGHT: 73 IN | TEMPERATURE: 96.1 F

## 2022-04-15 DIAGNOSIS — Z12.11 SCREENING FOR COLON CANCER: ICD-10-CM

## 2022-04-15 DIAGNOSIS — R06.02 SHORTNESS OF BREATH: Primary | ICD-10-CM

## 2022-04-15 DIAGNOSIS — J45.41 MODERATE PERSISTENT ASTHMA WITH EXACERBATION: ICD-10-CM

## 2022-04-15 LAB
EXPIRATION DATE: NORMAL
FLUAV AG NPH QL: NEGATIVE
FLUBV AG NPH QL: NEGATIVE
INTERNAL CONTROL: NORMAL
Lab: NORMAL

## 2022-04-15 PROCEDURE — 99214 OFFICE O/P EST MOD 30 MIN: CPT | Performed by: NURSE PRACTITIONER

## 2022-04-15 PROCEDURE — 87804 INFLUENZA ASSAY W/OPTIC: CPT | Performed by: NURSE PRACTITIONER

## 2022-04-15 RX ORDER — METHYLPREDNISOLONE 4 MG/1
TABLET ORAL
Qty: 21 TABLET | Refills: 0 | Status: SHIPPED | OUTPATIENT
Start: 2022-04-15 | End: 2022-06-03

## 2022-04-15 RX ORDER — LEVOFLOXACIN 500 MG/1
500 TABLET, FILM COATED ORAL DAILY
Qty: 7 TABLET | Refills: 0 | Status: SHIPPED | OUTPATIENT
Start: 2022-04-15 | End: 2022-06-03

## 2022-04-15 RX ORDER — ALBUTEROL SULFATE 90 UG/1
2 AEROSOL, METERED RESPIRATORY (INHALATION) EVERY 4 HOURS PRN
Qty: 18 G | Refills: 2 | Status: SHIPPED | OUTPATIENT
Start: 2022-04-15

## 2022-04-15 NOTE — PROGRESS NOTES
"Chief Complaint  Shortness of Breath, Cough, and Fever    Subjective          Angel Chandra presents to [facility]  History of Present Illness     Patient is a patient of Dr. Yessica Oneil.  This my first time seeing this patient.  He presents today with complaint of cough and shortness of air since Wednesday.  States symptoms got worse on Thursday.  Got exposed to allergens then.      Reports fever  Denies  increasing nasal congestion, ear pain or pressure, nausea, vomiting, diarrhea, sore throat,  loss of sense of taste or smell.    OTC: advair and singular, uses rescue inhaler since being sick.      Also asking for GI referral for colonoscopy.  Had adenoma 8 years ago on colonoscopy    Objective   Vital Signs:   /62   Pulse 78   Temp 96.1 °F (35.6 °C)   Ht 185.4 cm (72.99\")   Wt 84.4 kg (186 lb)   SpO2 97%   BMI 24.55 kg/m²     BMI is within normal parameters. No follow-up required.      Physical Exam  Constitutional:       Appearance: Normal appearance.   Cardiovascular:      Rate and Rhythm: Normal rate and regular rhythm.   Pulmonary:      Effort: Pulmonary effort is normal.      Breath sounds: Decreased air movement present.   Neurological:      Mental Status: He is alert and oriented to person, place, and time.   Psychiatric:         Mood and Affect: Mood normal.         Behavior: Behavior normal.         Thought Content: Thought content normal.         Judgment: Judgment normal.        Result Review :     Influenza A&B    Common Labsle 4/15/22   Rapid Influenza A Ag Negative   Rapid Influenza B Ag Negative                     Assessment and Plan    Diagnoses and all orders for this visit:    1. Shortness of breath (Primary)  -     POC Influenza A / B  -     COVID-19,[facility] ROUTINE, NP/OP SWAB IN TRANSPORT MEDIA OR ESWAB 72 HR TAT - Swab, Oropharynx    2. Moderate persistent asthma with exacerbation    3. Screening for colon cancer  -     Ambulatory Referral to " Gastroenterology    Other orders  -     methylPREDNISolone (MEDROL) 4 MG dose pack; Take as directed on package instructions.  Dispense: 21 tablet; Refill: 0  -     levoFLOXacin (Levaquin) 500 MG tablet; Take 1 tablet by mouth Daily.  Dispense: 7 tablet; Refill: 0  -     albuterol sulfate  (90 Base) MCG/ACT inhaler; Inhale 2 puffs Every 4 (Four) Hours As Needed for Wheezing.  Dispense: 18 g; Refill: 2      Treat for asthma exacerbation.  Refill of albuterol given.  Follow-up if no improvement.    Referral to gastroenterology made for screening colonoscopy.      Follow Up   No follow-ups on file.  Patient was given instructions and counseling regarding his condition or for health maintenance advice. Please see specific information pulled into the AVS if appropriate.

## 2022-04-16 LAB
LABCORP SARS-COV-2, NAA 2 DAY TAT: NORMAL
SARS-COV-2 RNA RESP QL NAA+PROBE: NOT DETECTED

## 2022-05-06 DIAGNOSIS — J45.30 MILD PERSISTENT ASTHMA WITHOUT COMPLICATION: Chronic | ICD-10-CM

## 2022-05-07 RX ORDER — MONTELUKAST SODIUM 10 MG/1
TABLET ORAL
Qty: 90 TABLET | Refills: 0 | Status: SHIPPED | OUTPATIENT
Start: 2022-05-07 | End: 2022-08-03

## 2022-06-03 ENCOUNTER — OFFICE VISIT (OUTPATIENT)
Dept: FAMILY MEDICINE CLINIC | Facility: CLINIC | Age: 42
End: 2022-06-03

## 2022-06-03 VITALS
HEIGHT: 73 IN | HEART RATE: 64 BPM | OXYGEN SATURATION: 98 % | BODY MASS INDEX: 24.6 KG/M2 | WEIGHT: 185.6 LBS | TEMPERATURE: 98.4 F | SYSTOLIC BLOOD PRESSURE: 100 MMHG | DIASTOLIC BLOOD PRESSURE: 70 MMHG

## 2022-06-03 DIAGNOSIS — G89.29 CHRONIC PAIN OF RIGHT KNEE: ICD-10-CM

## 2022-06-03 DIAGNOSIS — F32.A DEPRESSION, UNSPECIFIED DEPRESSION TYPE: Primary | Chronic | ICD-10-CM

## 2022-06-03 DIAGNOSIS — M25.561 CHRONIC PAIN OF RIGHT KNEE: ICD-10-CM

## 2022-06-03 DIAGNOSIS — D48.5 NEOPLASM OF UNCERTAIN BEHAVIOR OF SKIN: ICD-10-CM

## 2022-06-03 PROCEDURE — 99213 OFFICE O/P EST LOW 20 MIN: CPT | Performed by: FAMILY MEDICINE

## 2022-06-03 RX ORDER — ESCITALOPRAM OXALATE 20 MG/1
20 TABLET ORAL DAILY
Qty: 90 TABLET | Refills: 1 | Status: SHIPPED | OUTPATIENT
Start: 2022-06-03 | End: 2023-02-09

## 2022-06-03 NOTE — PROGRESS NOTES
"Chief Complaint  Anxiety    Subjective        Angel Chandra presents to [facility]  Patient is here today to follow-up on anxiety.  He is currently taking Lexapro 20 mg daily.  He states that his anxiety has been well controlled.  He also admits that on occasion he does take a half of a CBD gummy which also helps him shut down his brain.  He thinks that he could probably do better on a lower dose of Lexapro at this time and requests to decrease to 10 mg daily.  The last time he used hydroxyzine it was a month ago.  He has only used it a couple of times since the beginning.  It was beneficial when it was used.  No side effects of either.      Patient also has a couple of moles that he would like to have dermatology look at.  He has seen dermatology in the past.  He states that these moles have changed or are new.    Patient has a history of a right torn meniscus many years ago.  He states that he has suffered from right knee pain since that time.  He feels that lately it has been getting worse and he would like to see orthopedic surgery.  He has seen Dr. Cortes in the past for a shoulder issue.      Objective   Vital Signs:  /70   Pulse 64   Temp 98.4 °F (36.9 °C)   Ht 185.4 cm (72.99\")   Wt 84.2 kg (185 lb 9.6 oz)   SpO2 98%   BMI 24.49 kg/m²     BMI is within normal parameters. No other follow-up for BMI required.      Physical Exam  Vitals and nursing note reviewed.   Constitutional:       Appearance: Normal appearance. He is well-developed and normal weight.   HENT:      Head: Normocephalic and atraumatic.   Eyes:      General: No scleral icterus.     Conjunctiva/sclera: Conjunctivae normal.   Cardiovascular:      Rate and Rhythm: Normal rate and regular rhythm.      Heart sounds: Normal heart sounds.   Pulmonary:      Effort: Pulmonary effort is normal.      Breath sounds: Normal breath sounds.   Abdominal:      General: Bowel sounds are normal.      Palpations: Abdomen " is soft.   Musculoskeletal:         General: Normal range of motion.      Cervical back: Normal range of motion and neck supple.      Right lower leg: No edema.      Left lower leg: No edema.   Skin:     General: Skin is warm and dry.      Capillary Refill: Capillary refill takes less than 2 seconds.      Findings: Lesion (5 mm irregular brownish-tan papule on the posterior right lower extremity.  1 mm black macule located on the right areola) present. No rash.   Neurological:      General: No focal deficit present.      Mental Status: He is alert and oriented to person, place, and time.      Sensory: No sensory deficit.      Motor: No weakness.      Coordination: Coordination normal.      Gait: Gait normal.      Deep Tendon Reflexes: Reflexes normal.   Psychiatric:         Mood and Affect: Mood normal.         Behavior: Behavior normal.         Thought Content: Thought content normal.         Judgment: Judgment normal.        Result Review :  The following data was reviewed by: Yessica Oneil DO on 06/03/2022:                    Assessment and Plan   Diagnoses and all orders for this visit:    1. Depression, unspecified depression type (Primary)  -     escitalopram (LEXAPRO) 20 MG tablet; Take 1 tablet by mouth Daily.  Dispense: 90 tablet; Refill: 1    2. Neoplasm of uncertain behavior of skin  -     Ambulatory Referral to Dermatology    3. Chronic pain of right knee  -     Ambulatory Referral to Orthopedic Surgery    Patient is here today to follow-up on chronic stable depression.  Patient would prefer to continue cutting the Lexapro 20 mg tablets in half and just taking 10 mg daily.  Refill was sent to the pharmacy.    Knee pain. referral to Ortho.    Changing moles.  Referral to dermatologist         Follow Up   Return for Annual physical.  Patient was given instructions and counseling regarding his condition or for health maintenance advice. Please see specific information pulled into the AVS if appropriate.

## 2022-07-25 ENCOUNTER — OFFICE VISIT (OUTPATIENT)
Dept: FAMILY MEDICINE CLINIC | Facility: CLINIC | Age: 42
End: 2022-07-25

## 2022-07-25 VITALS
WEIGHT: 180.6 LBS | HEART RATE: 68 BPM | BODY MASS INDEX: 23.94 KG/M2 | TEMPERATURE: 97.8 F | OXYGEN SATURATION: 99 % | SYSTOLIC BLOOD PRESSURE: 108 MMHG | HEIGHT: 73 IN | DIASTOLIC BLOOD PRESSURE: 82 MMHG

## 2022-07-25 DIAGNOSIS — Z98.890 BACK PAIN WITH HISTORY OF SPINAL SURGERY: ICD-10-CM

## 2022-07-25 DIAGNOSIS — M54.9 BACK PAIN WITH HISTORY OF SPINAL SURGERY: ICD-10-CM

## 2022-07-25 DIAGNOSIS — M54.50 ACUTE RIGHT-SIDED LOW BACK PAIN WITHOUT SCIATICA: Primary | ICD-10-CM

## 2022-07-25 DIAGNOSIS — R29.3 ABNORMAL POSTURE: ICD-10-CM

## 2022-07-25 PROCEDURE — 99213 OFFICE O/P EST LOW 20 MIN: CPT | Performed by: FAMILY MEDICINE

## 2022-07-25 NOTE — PROGRESS NOTES
Chief Complaint  Hospital Follow Up Visit ([id number] [facility] /Pinched nerve in back believes it happened during tennis )    Subjective        Angel Chandra presents to [facility]  History of Present Illness     The patient presents in clinic today for a follow-up hospital ER visit.    He went to the emergency room on 06/25/2022 for muscle pain that presented from playing tennis with a friend. He states the pain was so severe that he could barely walk to the car. He then went home and took some muscle relaxers and pain medication with no relief. He went to the emergency room, and they gave him morphine and other pain medications, but he was still experiencing severe pain. He reports the pain is more on the right side of his low back and will radiate down to the right hip. He denies any numbness or tingling in the lower extremities. No weakness.  He does have a history of lower back problems for 25 years. He had prior surgery on his back 10 to 15 years ago, a lumbar discectomy. He states he has always had issues with his back but has been trying to stay away from having surgery. Today, he inquiries about getting an MRI to see if he needs surgery. He has completed 4 weeks of visits to the chiropractor and has done some exercises at home with no relief. He has not seen a physical therapist since this incident because he has done physical therapy at least 10 times and knows what he needs to do. He must use a cane or a walker on some days due to the severity of the pain. He states he thought he was making some improvement but about 1.5 weeks ago, he traveled to [place] and after the drive he experienced a lot of pain. He has been applying ice to his back. He uses a back brace, and he has acupressure pads that help him a lot. He currently takes ibuprofen or Duexis which provides relief. He also takes CBD which will help him sleep.     Objective   Vital Signs:  /82   Pulse 68   Temp  "97.8 °F (36.6 °C)   Ht 185.4 cm (72.99\")   Wt 81.9 kg (180 lb 9.6 oz)   SpO2 99%   BMI 23.83 kg/m²   Estimated body mass index is 23.83 kg/m² as calculated from the following:    Height as of this encounter: 185.4 cm (72.99\").    Weight as of this encounter: 81.9 kg (180 lb 9.6 oz).    BMI is within normal parameters. No other follow-up for BMI required.      Physical Exam  Vitals and nursing note reviewed.   Constitutional:       Appearance: Normal appearance. He is well-developed and normal weight.   HENT:      Head: Normocephalic and atraumatic.   Eyes:      General: No scleral icterus.     Conjunctiva/sclera: Conjunctivae normal.   Cardiovascular:      Rate and Rhythm: Normal rate and regular rhythm.      Heart sounds: Normal heart sounds.   Pulmonary:      Effort: Pulmonary effort is normal.      Breath sounds: Normal breath sounds.   Musculoskeletal:         General: Normal range of motion.      Cervical back: Normal range of motion and neck supple.      Right lower leg: No edema.      Left lower leg: No edema.      Comments: Minimal restriction of the lumbar spine in flexion and extension only   Skin:     General: Skin is warm and dry.      Capillary Refill: Capillary refill takes less than 2 seconds.      Findings: No rash.   Neurological:      Mental Status: He is alert and oriented to person, place, and time.      Sensory: No sensory deficit.      Motor: No weakness.      Coordination: Coordination normal.      Gait: Gait normal.      Deep Tendon Reflexes: Reflexes normal.   Psychiatric:         Mood and Affect: Mood normal.         Behavior: Behavior normal.         Thought Content: Thought content normal.         Judgment: Judgment normal.        Result Review :                Assessment and Plan   Diagnoses and all orders for this visit:    1. Acute right-sided low back pain without sciatica (Primary)  -     MRI Lumbar Spine Without Contrast; Future    2. Back pain with history of spinal surgery  -  "    MRI Lumbar Spine Without Contrast; Future    3. Abnormal posture  -     MRI Lumbar Spine Without Contrast; Future    Low back pain  I will put in an order for an MRI of the lumbar spine. Once we get the MRI results, we will discuss further if he needs to be referred to a spine specialist or to pain management.  He will continue to take his current medication regimen for pain. The patient agreed with this plan.          Follow Up   Return if symptoms worsen or fail to improve.  Patient was given instructions and counseling regarding his condition or for health maintenance advice. Please see specific information pulled into the AVS if appropriate.     Transcribed from ambient dictation for Yessica Oneil DO by Maricel Loredo.  07/25/22   15:44 EDT    Patient verbalized consent to the visit recording.

## 2022-08-03 DIAGNOSIS — J45.30 MILD PERSISTENT ASTHMA WITHOUT COMPLICATION: Chronic | ICD-10-CM

## 2022-08-03 RX ORDER — MONTELUKAST SODIUM 10 MG/1
TABLET ORAL
Qty: 90 TABLET | Refills: 0 | Status: SHIPPED | OUTPATIENT
Start: 2022-08-03 | End: 2022-11-14

## 2022-08-05 ENCOUNTER — HOSPITAL ENCOUNTER (OUTPATIENT)
Dept: MRI IMAGING | Facility: HOSPITAL | Age: 42
Discharge: HOME | End: 2022-08-05
Admitting: FAMILY MEDICINE

## 2022-08-05 DIAGNOSIS — M54.50 ACUTE RIGHT-SIDED LOW BACK PAIN WITHOUT SCIATICA: ICD-10-CM

## 2022-08-05 DIAGNOSIS — M54.9 BACK PAIN WITH HISTORY OF SPINAL SURGERY: ICD-10-CM

## 2022-08-05 DIAGNOSIS — Z98.890 BACK PAIN WITH HISTORY OF SPINAL SURGERY: ICD-10-CM

## 2022-08-05 DIAGNOSIS — R29.3 ABNORMAL POSTURE: ICD-10-CM

## 2022-08-05 PROCEDURE — 72158 MRI LUMBAR SPINE W/O & W/DYE: CPT

## 2022-08-05 PROCEDURE — 0 GADOBENATE DIMEGLUMINE 529 MG/ML SOLUTION: Performed by: FAMILY MEDICINE

## 2022-08-05 PROCEDURE — A9577 INJ MULTIHANCE: HCPCS | Performed by: FAMILY MEDICINE

## 2022-08-05 RX ADMIN — GADOBENATE DIMEGLUMINE 17 ML: 529 INJECTION, SOLUTION INTRAVENOUS at 19:09

## 2022-08-10 ENCOUNTER — OFFICE VISIT (OUTPATIENT)
Dept: FAMILY MEDICINE CLINIC | Facility: CLINIC | Age: 42
End: 2022-08-10

## 2022-08-10 VITALS
HEIGHT: 73 IN | SYSTOLIC BLOOD PRESSURE: 118 MMHG | OXYGEN SATURATION: 98 % | TEMPERATURE: 97.1 F | HEART RATE: 71 BPM | BODY MASS INDEX: 24.65 KG/M2 | WEIGHT: 186 LBS | DIASTOLIC BLOOD PRESSURE: 86 MMHG

## 2022-08-10 DIAGNOSIS — Z98.890 BACK PAIN WITH HISTORY OF SPINAL SURGERY: ICD-10-CM

## 2022-08-10 DIAGNOSIS — M54.9 BACK PAIN WITH HISTORY OF SPINAL SURGERY: ICD-10-CM

## 2022-08-10 DIAGNOSIS — M54.50 ACUTE RIGHT-SIDED LOW BACK PAIN WITHOUT SCIATICA: ICD-10-CM

## 2022-08-10 DIAGNOSIS — D49.2 NERVE SHEATH TUMOR: Primary | ICD-10-CM

## 2022-08-10 PROCEDURE — 99214 OFFICE O/P EST MOD 30 MIN: CPT | Performed by: FAMILY MEDICINE

## 2022-08-10 RX ORDER — BACLOFEN 10 MG/1
10 TABLET ORAL
COMMUNITY
Start: 2022-06-25

## 2022-08-10 RX ORDER — HYDROCODONE BITARTRATE AND ACETAMINOPHEN 5; 325 MG/1; MG/1
TABLET ORAL
COMMUNITY
Start: 2022-06-25

## 2022-08-10 NOTE — PROGRESS NOTES
"Chief Complaint  Back Pain (Low back pain, MRI follow up)    Subjective        Angel Chandra presents to [facility]  Patient is here today to review the results of his recent MRI of his lumbar spine.  He states that with chiropractic manipulation and therapy his back pain has been improving.  He is able to work and asked if he could play tennis.      Objective   Vital Signs:  /86   Pulse 71   Temp 97.1 °F (36.2 °C)   Ht 185.4 cm (72.99\")   Wt 84.4 kg (186 lb)   SpO2 98%   BMI 24.55 kg/m²   Estimated body mass index is 24.55 kg/m² as calculated from the following:    Height as of this encounter: 185.4 cm (72.99\").    Weight as of this encounter: 84.4 kg (186 lb).    BMI is within normal parameters. No other follow-up for BMI required.      Physical Exam  Vitals and nursing note reviewed.   Constitutional:       Appearance: He is well-developed.   HENT:      Head: Normocephalic and atraumatic.      Right Ear: External ear normal.      Left Ear: External ear normal.      Nose: Nose normal.   Eyes:      General: No scleral icterus.     Conjunctiva/sclera: Conjunctivae normal.   Musculoskeletal:      Cervical back: Normal range of motion and neck supple.   Lymphadenopathy:      Cervical: No cervical adenopathy.   Skin:     General: Skin is warm and dry.      Findings: No rash.   Neurological:      Mental Status: He is alert and oriented to person, place, and time.   Psychiatric:         Mood and Affect: Mood normal.         Behavior: Behavior normal.         Thought Content: Thought content normal.         Judgment: Judgment normal.        Result Review :      Data reviewed: Radiologic studies MRIs see below     MRI Lumbar Spine With & Without Contrast (08/05/2022 19:18)       Assessment and Plan   Diagnoses and all orders for this visit:    1. Nerve sheath tumor (Primary)  -     Ambulatory Referral to Spine Surgery    2. Acute right-sided low back pain without sciatica  -     " Ambulatory Referral to Spine Surgery    3. Back pain with history of spinal surgery  -     Ambulatory Referral to Spine Surgery    Reviewed the above MRI of the lumbar spine with the patient in detail.  Answered all questions.  Advised the patient against exercise at this point until he is seen by spine surgery.  Referral entered.       I spent 30 minutes caring for Angel on this date of service. This time includes time spent by me in the following activities:counseling and educating the patient/family/caregiver, referring and communicating with other health care professionals  and documenting information in the medical record  Follow Up   Return if symptoms worsen or fail to improve.  Patient was given instructions and counseling regarding his condition or for health maintenance advice. Please see specific information pulled into the AVS if appropriate.

## 2022-08-21 ENCOUNTER — PATIENT MESSAGE (OUTPATIENT)
Dept: FAMILY MEDICINE CLINIC | Facility: CLINIC | Age: 42
End: 2022-08-21

## 2022-08-21 DIAGNOSIS — M54.9 BACK PAIN WITH HISTORY OF SPINAL SURGERY: ICD-10-CM

## 2022-08-21 DIAGNOSIS — Z98.890 BACK PAIN WITH HISTORY OF SPINAL SURGERY: ICD-10-CM

## 2022-08-21 DIAGNOSIS — D49.2 NERVE SHEATH TUMOR: Primary | ICD-10-CM

## 2022-08-21 DIAGNOSIS — M54.50 ACUTE RIGHT-SIDED LOW BACK PAIN WITHOUT SCIATICA: ICD-10-CM

## 2022-08-22 NOTE — TELEPHONE ENCOUNTER
From: Angel Chandra  To: Yessica Oneil, DO  Sent: 8/21/2022 11:15 AM EDT  Subject: Referral    Dr Oneil, I’ve decided I’d like another referral to the [facility] in [place]. If you could put an urgent referral on that, I’d appreciate it very much. I have my appointment with the neurosurgeon this Thursday, but I want to go ahead and schedule [facility].

## 2022-08-25 NOTE — PROGRESS NOTES
Patient ID: Angel Chandra is a 41 y.o. male is being seen for consultation today at the request of Yessica Oneil DO.  Patient complains for low back pain. Patient denies numbness and tinglling in lower extremities.  Patient was hospitalized for this recently.  Patient had microdiscectomy 12 years ago.   MRI was completed on 8/5/22 at [facility].     Subjective     The patient is here in regards to   Chief Complaint   Patient presents with   • Back Pain       Angel is a 41-year-old gentleman who had sudden onset low back pain that was debilitating around mid June as he was stretching and warming up to get ready to play tennis.  He previously played at a very high level and was not particularly stressed that day.  He did end up in the ED and required a lot of IV and oral medication to get his pain under control including Dilaudid and muscle relaxants.  He says that this is happened once before approximately 8 years ago.  Since his flareup in June he has been treating this with ice, chiropractor, NSAIDs with some success and now it is greatly improved and more of a smoldering type of pain rather than debilitating.  He is frustrated that he is not able to go out and play golf or tennis since he has been resting his back.  Denies any bowel or bladder issues, denies any numbness or tingling or weakness.  Does not seem to have any radiculopathy in a dermatomal distribution.      While in the room and during my examination of the patient I wore a mask and eye protection.  I washed my hands before and after this patient encounter.  The patient was also wearing a mask.    The following portions of the patient's history were reviewed and updated as appropriate: allergies, current medications, past family history, past medical history, past social history, past surgical history and problem list.    Review of Systems   Constitutional: Positive for activity change.   Musculoskeletal: Positive for back pain.   Skin: Negative.     Allergic/Immunologic: Negative.    Neurological: Negative for weakness and numbness.   Psychiatric/Behavioral: Negative for sleep disturbance.        Past Medical History:   Diagnosis Date   • Asthma    • Depression    • Restless leg syndrome        Allergies   Allergen Reactions   • Prednisone Other (See Comments)     CONSTIPATION        Family History   Problem Relation Age of Onset   • No Known Problems Mother    • Hypertension Father    • Coronary artery disease Paternal Grandfather    • Alzheimer's disease Paternal Grandfather        Social History     Socioeconomic History   • Marital status:    Tobacco Use   • Smoking status: Never Smoker   • Smokeless tobacco: Never Used   Substance and Sexual Activity   • Alcohol use: Yes   • Drug use: No       Past Surgical History:   Procedure Laterality Date   • BACK SURGERY           Objective     Vitals:    08/26/22 1503   BP: 132/80   Pulse: 67   Resp: 18   SpO2: 97%     Body mass index is 24.82 kg/m².    Physical Exam  Constitutional:       Appearance: Normal appearance.   HENT:      Head: Normocephalic and atraumatic.   Eyes:      Extraocular Movements: Extraocular movements intact.      Conjunctiva/sclera: Conjunctivae normal.      Pupils: Pupils are equal, round, and reactive to light.   Cardiovascular:      Rate and Rhythm: Normal rate and regular rhythm.      Pulses: Normal pulses.   Pulmonary:      Breath sounds: Normal breath sounds.   Abdominal:      Palpations: Abdomen is soft.   Musculoskeletal:         General: Normal range of motion.      Cervical back: Normal range of motion and neck supple.      Comments: Very mild tenderness over his bilateral SI joints but not significant enough to be considered positive Meagan sign.  Negative Geronimo sign   Skin:     General: Skin is warm and dry.   Neurological:      Mental Status: He is alert and oriented to person, place, and time.      Cranial Nerves: Cranial nerves are intact.      Motor: Motor function  is intact. No weakness or atrophy.      Coordination: Coordination is intact. Romberg sign negative. Romberg Test normal.      Gait: Gait is intact. Gait normal.      Deep Tendon Reflexes: Reflexes are normal and symmetric.      Reflex Scores:       Tricep reflexes are 2+ on the right side and 2+ on the left side.       Bicep reflexes are 2+ on the right side and 2+ on the left side.       Brachioradialis reflexes are 2+ on the right side and 2+ on the left side.       Patellar reflexes are 2+ on the right side and 2+ on the left side.       Achilles reflexes are 2+ on the right side and 2+ on the left side.  Psychiatric:         Speech: Speech normal.         Neurologic Exam     Mental Status   Oriented to person, place, and time.   Attention: normal. Concentration: normal.   Speech: speech is normal   Level of consciousness: alert    Cranial Nerves   Cranial nerves II through XII intact.     CN III, IV, VI   Pupils are equal, round, and reactive to light.    Motor Exam   Muscle bulk: normal  Overall muscle tone: normal    Strength   Strength 5/5 except as noted.     Sensory Exam   Light touch normal.     Gait, Coordination, and Reflexes     Gait  Gait: normal    Coordination   Romberg: negative    Reflexes   Reflexes 2+ except as noted.   Right brachioradialis: 2+  Left brachioradialis: 2+  Right biceps: 2+  Left biceps: 2+  Right triceps: 2+  Left triceps: 2+  Right patellar: 2+  Left patellar: 2+  Right achilles: 2+  Left achilles: 2+      Assessment & Plan   Independent Review of Radiographic Studies:      I personally reviewed the images from the following studies.    MR: MRI of the lumbar spine wo contrast was reviewed and shows Normal-appearing lumbar spine with some degeneration of the L4-5 level with Modic endplate changes and Schmorl's node, there is no listhesis at this level.  The facets are more degenerative than the other levels especially at the right L4-5.  There is no significant neurocompression.   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    Assessment/Plan: Angel has been treating his back pain with ice, NSAIDs and rest for 2 months now and I suspect that his symptoms have improved significantly since then.  It is difficult to ascertain whether or not this is true SI joint inflammation, perhaps because it is already been mostly treated.  I did go over the MRI with Angel and we talked about his lumbar spine which has some mild degenerative disease but is not unusual for his age and not severe.  He also does not have any axial back pain or radiculopathy which makes me think that his issue does not originate in the spine.  My plan is to cautiously observe what appears to be a nerve sheath tumor along L5 with a repeat MRI 6 months from now with contrast, I am hesitant to operate on this given the risks to a major motor carrying nerve root.    Medical Decision Making:      MRI 6 months with and without contrast of the lumbar spine  Gabapentin 100 3 times daily  SI joint injection, bilateral         Diagnoses and all orders for this visit:    1. SI (sacroiliac) joint inflammation (HCC) (Primary)  -     SI Joint Injection  -     gabapentin (NEURONTIN) 100 MG capsule; Take 1 capsule by mouth 3 (Three) Times a Day for 60 days.  Dispense: 90 capsule; Refill: 1    2. Nerve sheath tumor  -     MRI Lumbar Spine With & Without Contrast; Future             Patient Instructions/Recommendations:    -Use ice as needed for breakthrough pain from the SI joint  -Alternate Tylenol and Aleve over-the-counter anti-inflammatory medications  -Consider an ice bath or SI joint steroid injection if the above measures are not successful        Emeterio Cedeno MD  08/26/22  15:40 EDT

## 2022-08-26 ENCOUNTER — OFFICE VISIT (OUTPATIENT)
Dept: NEUROSURGERY | Facility: CLINIC | Age: 42
End: 2022-08-26

## 2022-08-26 VITALS
BODY MASS INDEX: 24.79 KG/M2 | OXYGEN SATURATION: 97 % | WEIGHT: 183 LBS | HEIGHT: 72 IN | DIASTOLIC BLOOD PRESSURE: 80 MMHG | HEART RATE: 67 BPM | SYSTOLIC BLOOD PRESSURE: 132 MMHG | RESPIRATION RATE: 18 BRPM

## 2022-08-26 DIAGNOSIS — D49.2 NERVE SHEATH TUMOR: ICD-10-CM

## 2022-08-26 DIAGNOSIS — M46.1 SI (SACROILIAC) JOINT INFLAMMATION: Primary | ICD-10-CM

## 2022-08-26 PROCEDURE — 99204 OFFICE O/P NEW MOD 45 MIN: CPT | Performed by: NEUROLOGICAL SURGERY

## 2022-08-26 RX ORDER — GABAPENTIN 100 MG/1
100 CAPSULE ORAL 3 TIMES DAILY
Qty: 90 CAPSULE | Refills: 1 | Status: SHIPPED | OUTPATIENT
Start: 2022-08-26 | End: 2022-10-25

## 2022-09-12 ENCOUNTER — APPOINTMENT (OUTPATIENT)
Dept: PAIN MEDICINE | Facility: HOSPITAL | Age: 42
End: 2022-09-12

## 2022-11-14 DIAGNOSIS — J45.30 MILD PERSISTENT ASTHMA WITHOUT COMPLICATION: Chronic | ICD-10-CM

## 2022-11-14 RX ORDER — MONTELUKAST SODIUM 10 MG/1
TABLET ORAL
Qty: 90 TABLET | Refills: 3 | Status: SHIPPED | OUTPATIENT
Start: 2022-11-14

## 2022-11-29 DIAGNOSIS — J45.30 MILD PERSISTENT ASTHMA WITHOUT COMPLICATION: Chronic | ICD-10-CM

## 2022-11-29 RX ORDER — FLUTICASONE PROPIONATE AND SALMETEROL 250; 50 UG/1; UG/1
1 POWDER RESPIRATORY (INHALATION)
Qty: 60 EACH | Refills: 5 | Status: SHIPPED | OUTPATIENT
Start: 2022-11-29

## 2022-11-29 NOTE — TELEPHONE ENCOUNTER
===========[id number]=================  Tue 22 08:00a  ======================================  Call Type: Non Urgent       Reg Dr: Clarice      Pt Name: RHONDA GRIFFITH        Phone:  [phone];          : 1980      Pt Preg: NO          Msg: NEEDS ADVAIR INHALER  REFILLED    --------------------------------------  Message History  Account: [id number]  Taken:  2022  4:49p PO  Serial#: 1  ===========[id number]

## 2023-01-11 ENCOUNTER — TELEPHONE (OUTPATIENT)
Dept: FAMILY MEDICINE CLINIC | Facility: CLINIC | Age: 43
End: 2023-01-11

## 2023-01-11 NOTE — TELEPHONE ENCOUNTER
Caller: Angel Chandra    Relationship: Self    Best call back number:   [phone] (Home)    What was the call regarding: PATIENT IS NEEDING OFFICE TO FAX HIS LAST PHYSICAL APPT NOTES TO EMPLOYER  [employer]      ATTN BONI HASSAN     HE DIDN'T HAVE FAX NUMBER     Do you require a callback: IF NEEDED

## 2023-01-26 ENCOUNTER — APPOINTMENT (OUTPATIENT)
Dept: MRI IMAGING | Facility: HOSPITAL | Age: 43
End: 2023-01-26
Payer: COMMERCIAL

## 2023-01-26 ENCOUNTER — HOSPITAL ENCOUNTER (OUTPATIENT)
Dept: MRI IMAGING | Facility: HOSPITAL | Age: 43
Discharge: HOME | End: 2023-01-26
Admitting: NEUROLOGICAL SURGERY
Payer: COMMERCIAL

## 2023-01-26 DIAGNOSIS — D49.2 NERVE SHEATH TUMOR: ICD-10-CM

## 2023-01-26 PROCEDURE — A9577 INJ MULTIHANCE: HCPCS | Performed by: NEUROLOGICAL SURGERY

## 2023-01-26 PROCEDURE — 72158 MRI LUMBAR SPINE W/O & W/DYE: CPT

## 2023-01-26 PROCEDURE — 0 GADOBENATE DIMEGLUMINE 529 MG/ML SOLUTION: Performed by: NEUROLOGICAL SURGERY

## 2023-01-26 RX ADMIN — GADOBENATE DIMEGLUMINE 17 ML: 529 INJECTION, SOLUTION INTRAVENOUS at 17:36

## 2023-02-09 DIAGNOSIS — F32.A DEPRESSION, UNSPECIFIED DEPRESSION TYPE: Chronic | ICD-10-CM

## 2023-02-09 RX ORDER — ESCITALOPRAM OXALATE 20 MG/1
TABLET ORAL
Qty: 90 TABLET | Refills: 1 | Status: SHIPPED | OUTPATIENT
Start: 2023-02-09

## 2023-06-05 ENCOUNTER — OFFICE VISIT (OUTPATIENT)
Dept: FAMILY MEDICINE CLINIC | Facility: CLINIC | Age: 43
End: 2023-06-05
Payer: COMMERCIAL

## 2023-06-05 ENCOUNTER — TELEPHONE (OUTPATIENT)
Dept: SURGERY | Facility: CLINIC | Age: 43
End: 2023-06-05
Payer: COMMERCIAL

## 2023-06-05 VITALS
OXYGEN SATURATION: 98 % | DIASTOLIC BLOOD PRESSURE: 84 MMHG | BODY MASS INDEX: 24.57 KG/M2 | SYSTOLIC BLOOD PRESSURE: 122 MMHG | HEART RATE: 59 BPM | HEIGHT: 73 IN | WEIGHT: 185.4 LBS

## 2023-06-05 DIAGNOSIS — M54.12 CERVICAL RADICULITIS: Primary | ICD-10-CM

## 2023-06-05 DIAGNOSIS — Z86.010 PERSONAL HISTORY OF COLONIC POLYPS: ICD-10-CM

## 2023-06-05 DIAGNOSIS — F41.0 PANIC ATTACKS: ICD-10-CM

## 2023-06-05 PROCEDURE — 99214 OFFICE O/P EST MOD 30 MIN: CPT | Performed by: FAMILY MEDICINE

## 2023-06-05 RX ORDER — BUSPIRONE HYDROCHLORIDE 5 MG/1
5-15 TABLET ORAL 2 TIMES DAILY PRN
Qty: 30 TABLET | Refills: 0 | Status: SHIPPED | OUTPATIENT
Start: 2023-06-05

## 2023-06-05 RX ORDER — BACLOFEN 10 MG/1
10 TABLET ORAL 3 TIMES DAILY
Qty: 30 TABLET | Refills: 1 | Status: SHIPPED | OUTPATIENT
Start: 2023-06-05

## 2023-06-05 NOTE — PROGRESS NOTES
"Chief Complaint  Numbness (Left hand in first 2 fingers and thumb happened after tennis )    Subjective        Angel Chandra presents to [facility]  History of Present Illness  Patient is here today for a new problem.  He noticed after playing tennis he was having pain all the way from the left side of his neck down his left shoulder and toward his elbow.  This is also associated with tingling sensation in his Left thumb second and third fingers and some intermittent numbness in those same fingers.  He explains that he had a right shoulder injury and was likely overcompensating while playing tennis with his left upper extremity which may have contributed to the symptoms.    Patient is here today to follow-up on anxiety.  He is currently taking Lexapro 20 mg daily.  He states that his anxiety has not been well controlled.  The last time he used hydroxyzine it was a couple of weeks ago it just made him tired and did not help his anxiety.  He has only used it a couple of times since the beginning.  It was beneficial when it was used.  No side effects of either.      Patient states that he has had adenomatous polyps removed greater than 10 years ago during a colonoscopy in [place].  He is overdue for follow-up.      Objective   Vital Signs:  /84   Pulse 59   Ht 185.4 cm (73\")   Wt 84.1 kg (185 lb 6.4 oz)   SpO2 98%   BMI 24.46 kg/m²   Estimated body mass index is 24.46 kg/m² as calculated from the following:    Height as of this encounter: 185.4 cm (73\").    Weight as of this encounter: 84.1 kg (185 lb 6.4 oz).       BMI is within normal parameters. No other follow-up for BMI required.      Physical Exam  Vitals and nursing note reviewed.   Constitutional:       Appearance: Normal appearance. He is well-developed and normal weight.   HENT:      Head: Normocephalic and atraumatic.   Eyes:      General: No scleral icterus.     Conjunctiva/sclera: Conjunctivae normal. "   Cardiovascular:      Pulses: Normal pulses.   Musculoskeletal:         General: Normal range of motion.      Cervical back: Normal range of motion and neck supple.      Right lower leg: No edema.      Left lower leg: No edema.      Comments: Full range of motion of neck and bilateral upper extremities.  Negative empty can sign.  Patient does have boggy musculature especially in the left trapezius and scalene muscles.   Lymphadenopathy:      Cervical: No cervical adenopathy.   Skin:     General: Skin is warm and dry.      Findings: No rash.   Neurological:      Mental Status: He is alert and oriented to person, place, and time.      Motor: No weakness.      Coordination: Coordination normal.      Gait: Gait normal.      Deep Tendon Reflexes: Reflexes normal.      Comments: Normal  strength bilaterally   Psychiatric:         Mood and Affect: Mood normal.         Behavior: Behavior normal.         Thought Content: Thought content normal.         Judgment: Judgment normal.      Result Review :                   Assessment and Plan   Diagnoses and all orders for this visit:    1. Cervical radiculitis (Primary)  -     Ambulatory Referral to Physical Therapy Evaluate and treat  -     baclofen (LIORESAL) 10 MG tablet; Take 1 tablet by mouth 3 (Three) Times a Day.  Dispense: 30 tablet; Refill: 1    2. Panic attacks  -     busPIRone (BUSPAR) 5 MG tablet; Take 1-3 tablets by mouth 2 (Two) Times a Day As Needed (Panic attacks).  Dispense: 30 tablet; Refill: 0    3. Personal history of colonic polyps  -     Ambulatory Referral to General Surgery      Patient is here for new problem of cervical radiculitis.  There are no red flags on exam.  Patient will attend physical therapy and take baclofen and Tylenol as needed.  If his symptoms are worsening he should follow-up.  Would consider imaging at that time..    Patient is also here for persistent panic attacks.  Since hydroxyzine did not help the patient I have discontinued  that and will try a trial of BuSpar.  Patient should follow-up if the symptoms are not improving.    History of adenomatous colon polyps.  Referral entered for colonoscopy.         Follow Up   Return for Annual physical.  Patient was given instructions and counseling regarding his condition or for health maintenance advice. Please see specific information pulled into the AVS if appropriate.

## 2023-06-21 ENCOUNTER — TELEPHONE (OUTPATIENT)
Dept: FAMILY MEDICINE CLINIC | Facility: CLINIC | Age: 43
End: 2023-06-21

## 2023-06-21 NOTE — TELEPHONE ENCOUNTER
Caller: Angel Chandra    Relationship: Self    Best call back number: [phone]    What is the best time to reach you: ANYTIME    Who are you requesting to speak with (clinical staff, provider,  specific staff member): DR OBRIEN    What was the call regarding: PATIENT STATES THAT DR OBRIEN TOLD HI TO CALLBACK IF HE STILL HAD NOT REGAINED FEELING IN HIS FINGERS AS HE WAS EXPERIENCING NUMBNESS AND TINGLING AND SHE HAD MADE A REFERRAL FOR HIM. PATIENT STATES  THEY DISCUSS POTENTIALLY GABAPENTIN OR A STEROID PACK. PATIENT IS REQUESTING A CALLBACK ON WHAT THE NEXT STEPS ARE.    PREFERRED PHARMACY:  [facility]/pharmacy #[id number] - [place], [place] - [address] - [phone] [facility] [phone]  [phone]

## 2023-08-07 DIAGNOSIS — J45.30 MILD PERSISTENT ASTHMA WITHOUT COMPLICATION: Chronic | ICD-10-CM

## 2023-08-07 RX ORDER — FLUTICASONE PROPIONATE AND SALMETEROL 50; 250 UG/1; UG/1
POWDER RESPIRATORY (INHALATION)
Qty: 60 EACH | Refills: 5 | Status: SHIPPED | OUTPATIENT
Start: 2023-08-07

## 2023-08-24 ENCOUNTER — OFFICE VISIT (OUTPATIENT)
Dept: FAMILY MEDICINE CLINIC | Facility: CLINIC | Age: 43
End: 2023-08-24
Payer: COMMERCIAL

## 2023-08-24 VITALS
WEIGHT: 189.6 LBS | HEART RATE: 59 BPM | SYSTOLIC BLOOD PRESSURE: 132 MMHG | BODY MASS INDEX: 25.13 KG/M2 | HEIGHT: 73 IN | DIASTOLIC BLOOD PRESSURE: 84 MMHG | OXYGEN SATURATION: 99 %

## 2023-08-24 DIAGNOSIS — M79.645 PAIN OF LEFT THUMB: ICD-10-CM

## 2023-08-24 DIAGNOSIS — M25.532 ARTHRALGIA OF LEFT FOREARM: Primary | ICD-10-CM

## 2023-08-24 DIAGNOSIS — R20.0 NUMBNESS OF FINGER: ICD-10-CM

## 2023-08-24 PROCEDURE — 99213 OFFICE O/P EST LOW 20 MIN: CPT | Performed by: FAMILY MEDICINE

## 2023-08-24 NOTE — PROGRESS NOTES
"Chief Complaint  poss carpal javier    Subjective        Angel Chandra presents to [facility]  History of Present Illness  Patient is here today with persistent symptoms of pain shooting from his left elbow into his hand which is associated with some numbness and pain in his left index finger.  He states that he only feels the pain from the elbow down and the pain increases with gripping or moving his fingers.  He denies any weakness.  He denies any welling or redness.  Patient denies any trauma or injury.  He states that just prior to symptoms starting he was playing a lot of video games and using his iPhone with the left hand.  It seems to improve with rest but worsen with movements of the hand.  He underwent physical therapy of his cervical spine which did not improve his symptoms but did help his shoulder improve.    Objective   Vital Signs:  /84   Pulse 59   Ht 185.4 cm (73\")   Wt 86 kg (189 lb 9.6 oz)   SpO2 99%   BMI 25.01 kg/mý   Estimated body mass index is 25.01 kg/mý as calculated from the following:    Height as of this encounter: 185.4 cm (73\").    Weight as of this encounter: 86 kg (189 lb 9.6 oz).             Physical Exam  Vitals and nursing note reviewed.   Constitutional:       Appearance: Normal appearance. He is well-developed.   HENT:      Head: Normocephalic and atraumatic.   Eyes:      General: No scleral icterus.     Conjunctiva/sclera: Conjunctivae normal.   Pulmonary:      Effort: Pulmonary effort is normal.   Musculoskeletal:         General: Tenderness (Tenderness to palpation over the posterior forearm and left index finger) present. No swelling or deformity. Normal range of motion.      Cervical back: Normal range of motion and neck supple.      Right lower leg: No edema.      Left lower leg: No edema.   Skin:     General: Skin is warm and dry.      Findings: No rash.   Neurological:      Mental Status: He is alert and oriented to person, place, " and time.   Psychiatric:         Mood and Affect: Mood normal.         Behavior: Behavior normal.         Thought Content: Thought content normal.         Judgment: Judgment normal.      Result Review :  The following data was reviewed by: Yessica Oneil DO on 08/24/2023:                   Assessment and Plan   Diagnoses and all orders for this visit:    1. Arthralgia of left forearm (Primary)  -     Cancel: Ambulatory Referral to Physical Therapy Evaluate and treat  -     Ambulatory Referral to Physical Therapy Evaluate and treat    2. Numbness of finger  -     Cancel: Ambulatory Referral to Physical Therapy Evaluate and treat  -     Ambulatory Referral to Physical Therapy Evaluate and treat    3. Pain of left thumb  -     Cancel: Ambulatory Referral to Physical Therapy Evaluate and treat  -     Ambulatory Referral to Physical Therapy Evaluate and treat    Patient is here today with some new left forearm and hand symptoms.  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  Patient was informed on the proper use of a cock up wrist splint and was advised to use it at night during sleep.  He was also advised to undergo physical therapy on the left forearm and use ice only without heat afterward.  He may use compression sleeve around the elbow if it helps.  If his symptoms persist I would consider referral to a specialist (ortho vs hand)         Follow Up   No follow-ups on file.  Patient was given instructions and counseling regarding his condition or for health maintenance advice. Please see specific information pulled into the AVS if appropriate.

## 2023-09-16 DIAGNOSIS — M54.12 CERVICAL RADICULITIS: ICD-10-CM

## 2023-09-18 RX ORDER — BACLOFEN 10 MG/1
TABLET ORAL
Qty: 30 TABLET | Refills: 1 | Status: SHIPPED | OUTPATIENT
Start: 2023-09-18

## 2023-11-21 DIAGNOSIS — J45.30 MILD PERSISTENT ASTHMA WITHOUT COMPLICATION: Chronic | ICD-10-CM

## 2023-11-21 RX ORDER — MONTELUKAST SODIUM 10 MG/1
TABLET ORAL
Qty: 90 TABLET | Refills: 0 | Status: SHIPPED | OUTPATIENT
Start: 2023-11-21

## 2024-02-02 ENCOUNTER — TELEPHONE (OUTPATIENT)
Dept: SURGERY | Facility: CLINIC | Age: 44
End: 2024-02-02

## 2024-02-02 NOTE — TELEPHONE ENCOUNTER
Hub staff attempted to follow warm transfer process and was unsuccessful     Caller: ERIN RIGGS    Relationship to patient: SELF    Best call back number: [phone] (home)       Patient is needing: READY TO SCHEDULE COLONOSCOPY

## 2024-02-06 NOTE — TELEPHONE ENCOUNTER
Spoke with pt and amelia c-scope @ [facility] 02/28/24 @ 10:00 am with arrival time 9:00 am.

## 2024-02-07 ENCOUNTER — PREP FOR SURGERY (OUTPATIENT)
Dept: SURGERY | Facility: SURGERY CENTER | Age: 44
End: 2024-02-07
Payer: COMMERCIAL

## 2024-02-07 DIAGNOSIS — Z12.11 COLON CANCER SCREENING: Primary | ICD-10-CM

## 2024-02-13 PROBLEM — Z12.11 COLON CANCER SCREENING: Status: ACTIVE | Noted: 2024-02-07

## 2024-02-21 DIAGNOSIS — J45.30 MILD PERSISTENT ASTHMA WITHOUT COMPLICATION: Chronic | ICD-10-CM

## 2024-02-21 DIAGNOSIS — F32.A DEPRESSION, UNSPECIFIED DEPRESSION TYPE: Chronic | ICD-10-CM

## 2024-02-21 RX ORDER — ESCITALOPRAM OXALATE 20 MG/1
TABLET ORAL
Qty: 90 TABLET | Refills: 0 | Status: SHIPPED | OUTPATIENT
Start: 2024-02-21

## 2024-02-21 RX ORDER — MONTELUKAST SODIUM 10 MG/1
TABLET ORAL
Qty: 90 TABLET | Refills: 0 | Status: SHIPPED | OUTPATIENT
Start: 2024-02-21

## 2024-02-28 ENCOUNTER — ANESTHESIA (OUTPATIENT)
Dept: SURGERY | Facility: SURGERY CENTER | Age: 44
End: 2024-02-28
Payer: COMMERCIAL

## 2024-02-28 ENCOUNTER — ANESTHESIA EVENT (OUTPATIENT)
Dept: SURGERY | Facility: SURGERY CENTER | Age: 44
End: 2024-02-28
Payer: COMMERCIAL

## 2024-02-28 ENCOUNTER — HOSPITAL ENCOUNTER (OUTPATIENT)
Facility: SURGERY CENTER | Age: 44
Setting detail: HOSPITAL OUTPATIENT SURGERY
Discharge: HOME | End: 2024-02-28
Attending: SURGERY | Admitting: SURGERY
Payer: COMMERCIAL

## 2024-02-28 VITALS
RESPIRATION RATE: 16 BRPM | DIASTOLIC BLOOD PRESSURE: 98 MMHG | TEMPERATURE: 97.5 F | BODY MASS INDEX: 23.99 KG/M2 | HEART RATE: 52 BPM | WEIGHT: 181 LBS | OXYGEN SATURATION: 96 % | SYSTOLIC BLOOD PRESSURE: 111 MMHG | HEIGHT: 73 IN

## 2024-02-28 DIAGNOSIS — Z12.11 COLON CANCER SCREENING: ICD-10-CM

## 2024-02-28 PROCEDURE — 88305 TISSUE EXAM BY PATHOLOGIST: CPT | Performed by: SURGERY

## 2024-02-28 PROCEDURE — 45385 COLONOSCOPY W/LESION REMOVAL: CPT | Performed by: SURGERY

## 2024-02-28 PROCEDURE — 45380 COLONOSCOPY AND BIOPSY: CPT | Performed by: SURGERY

## 2024-02-28 PROCEDURE — 25810000003 LACTATED RINGERS PER 1000 ML: Performed by: SURGERY

## 2024-02-28 PROCEDURE — 25010000002 PROPOFOL 1000 MG/100ML EMULSION: Performed by: STUDENT IN AN ORGANIZED HEALTH CARE EDUCATION/TRAINING PROGRAM

## 2024-02-28 PROCEDURE — 25810000003 LACTATED RINGERS PER 1000 ML: Performed by: STUDENT IN AN ORGANIZED HEALTH CARE EDUCATION/TRAINING PROGRAM

## 2024-02-28 PROCEDURE — 25010000002 LIDOCAINE 1 % SOLUTION: Performed by: STUDENT IN AN ORGANIZED HEALTH CARE EDUCATION/TRAINING PROGRAM

## 2024-02-28 PROCEDURE — 25010000002 PROPOFOL 10 MG/ML EMULSION: Performed by: STUDENT IN AN ORGANIZED HEALTH CARE EDUCATION/TRAINING PROGRAM

## 2024-02-28 RX ORDER — LIDOCAINE HYDROCHLORIDE 10 MG/ML
INJECTION, SOLUTION INFILTRATION; PERINEURAL AS NEEDED
Status: DISCONTINUED | OUTPATIENT
Start: 2024-02-28 | End: 2024-02-28 | Stop reason: SURG

## 2024-02-28 RX ORDER — LIDOCAINE HYDROCHLORIDE 10 MG/ML
0.5 INJECTION, SOLUTION INFILTRATION; PERINEURAL ONCE AS NEEDED
Status: DISCONTINUED | OUTPATIENT
Start: 2024-02-28 | End: 2024-02-28 | Stop reason: HOSPADM

## 2024-02-28 RX ORDER — PROPOFOL 10 MG/ML
INJECTION, EMULSION INTRAVENOUS AS NEEDED
Status: DISCONTINUED | OUTPATIENT
Start: 2024-02-28 | End: 2024-02-28 | Stop reason: SURG

## 2024-02-28 RX ORDER — SODIUM CHLORIDE, SODIUM LACTATE, POTASSIUM CHLORIDE, CALCIUM CHLORIDE 600; 310; 30; 20 MG/100ML; MG/100ML; MG/100ML; MG/100ML
INJECTION, SOLUTION INTRAVENOUS CONTINUOUS PRN
Status: DISCONTINUED | OUTPATIENT
Start: 2024-02-28 | End: 2024-02-28 | Stop reason: SURG

## 2024-02-28 RX ORDER — SODIUM CHLORIDE, SODIUM LACTATE, POTASSIUM CHLORIDE, CALCIUM CHLORIDE 600; 310; 30; 20 MG/100ML; MG/100ML; MG/100ML; MG/100ML
1000 INJECTION, SOLUTION INTRAVENOUS CONTINUOUS
Status: DISCONTINUED | OUTPATIENT
Start: 2024-02-28 | End: 2024-02-28 | Stop reason: HOSPADM

## 2024-02-28 RX ORDER — SODIUM CHLORIDE 0.9 % (FLUSH) 0.9 %
10 SYRINGE (ML) INJECTION AS NEEDED
Status: DISCONTINUED | OUTPATIENT
Start: 2024-02-28 | End: 2024-02-28 | Stop reason: HOSPADM

## 2024-02-28 RX ADMIN — PROPOFOL 100 MG: 10 INJECTION, EMULSION INTRAVENOUS at 10:46

## 2024-02-28 RX ADMIN — SODIUM CHLORIDE, POTASSIUM CHLORIDE, SODIUM LACTATE AND CALCIUM CHLORIDE 1000 ML: 600; 310; 30; 20 INJECTION, SOLUTION INTRAVENOUS at 09:31

## 2024-02-28 RX ADMIN — SODIUM CHLORIDE, SODIUM LACTATE, POTASSIUM CHLORIDE, AND CALCIUM CHLORIDE: .6; .31; .03; .02 INJECTION, SOLUTION INTRAVENOUS at 10:46

## 2024-02-28 RX ADMIN — PROPOFOL 160 MCG/KG/MIN: 10 INJECTION, EMULSION INTRAVENOUS at 10:47

## 2024-02-28 RX ADMIN — LIDOCAINE HYDROCHLORIDE 30 MG: 10 INJECTION, SOLUTION INFILTRATION; PERINEURAL at 10:46

## 2024-02-28 NOTE — H&P
Called consult to answering service. Pt NPO at midnight per NP. orders noted. General Surgery      Patient Care Team:  Yessica Oneil DO as PCP - General (Family Medicine)    CHIEF COMPLAINT: History of colon polyps    HISTORY OF PRESENT ILLNESS:    This very pleasant 43-year-old is here for colonoscopy.  He had adenomatous polyps removed greater than 10 years ago.  He moves his bowels daily.  He does have a family history of colon cancer as well.      Past Medical History:   Diagnosis Date    Asthma     Depression     Restless leg syndrome      Past Surgical History:   Procedure Laterality Date    BACK SURGERY       Family History   Problem Relation Age of Onset    No Known Problems Mother     Hypertension Father     Coronary artery disease Paternal Grandfather     Alzheimer's disease Paternal Grandfather      Social History     Tobacco Use    Smoking status: Never    Smokeless tobacco: Never   Substance Use Topics    Alcohol use: Yes    Drug use: No     Medications Prior to Admission   Medication Sig Dispense Refill Last Dose    Advair Diskus 250-50 MCG/ACT DISKUS INHALE 1 PUFF TWICE A DAY 60 each 5 2/28/2024    escitalopram (LEXAPRO) 20 MG tablet TAKE 1 TABLET BY MOUTH EVERY DAY 90 tablet 0 Past Week    loratadine (CLARITIN) 10 MG tablet Take 1 tablet by mouth Daily.   Past Week    albuterol sulfate  (90 Base) MCG/ACT inhaler Inhale 2 puffs Every 4 (Four) Hours As Needed for Wheezing. 18 g 2 Unknown    baclofen (LIORESAL) 10 MG tablet TAKE 1 TABLET BY MOUTH THREE TIMES A DAY 30 tablet 1 Unknown    busPIRone (BUSPAR) 5 MG tablet Take 1-3 tablets by mouth 2 (Two) Times a Day As Needed (Panic attacks). 30 tablet 0 Unknown    gabapentin (NEURONTIN) 100 MG capsule Take 1 capsule by mouth 3 (Three) Times a Day for 60 days. 90 capsule 1     montelukast (SINGULAIR) 10 MG tablet TAKE 1 TABLET BY MOUTH EVERY DAY AT NIGHT 90 tablet 0 More than a month     Allergies:  Prednisone    REVIEW OF SYSTEMS:  Please see the above history of present illness for pertinent positives and negatives.  The  "remainder of the patient's systems have been reviewed and are negative.     Vital Signs  Temp:  [97 °F (36.1 °C)] 97 °F (36.1 °C)  Heart Rate:  [62] 62  Resp:  [16] 16  BP: (120)/(76) 120/76    Flowsheet Rows      Flowsheet Row First Filed Value   Admission Height 185.4 cm (73\") Documented at 02/28/2024 0926   Admission Weight 82.1 kg (181 lb) Documented at 02/28/2024 0926             Physical Exam:  Physical Exam   Constitutional: Patient appears well-developed and well-nourished and in no acute distress   HEENT:   Head: Normocephalic and atraumatic.   Eyes:  Pupils are equal, round, and reactive to light.  Mouth and Throat: Patient has moist mucous membranes.   Musculoskeletal: Normal posture.  Extremities: No edema.   Neurological: Patient is alert and oriented.  Psychological:   Mood and behavior appropriate.  Skin: Skin is warm and dry.    Debilities/Disabilities Identified: None    Emotional Behavior: Appropriate           Results Review:    I reviewed the patient's new clinical results.  Lab Results (most recent)       None            Imaging Results (Most Recent)       None              ECG/EMG Results (most recent)       None              Assessment & Plan     History of colon polyps  I discussed with Angel the benefits and risks and rationale for colonoscopy.  He appeared to understand and is willing to proceed.    Tyra Britton DO  02/28/24  10:43 EST         "

## 2024-02-28 NOTE — ANESTHESIA POSTPROCEDURE EVALUATION
Patient: Angel Chandra    Procedure Summary       Date: 02/28/24 Room / Location: [facility] OR 07 / [facility]    Anesthesia Start: 1043 Anesthesia Stop: 1112    Procedure: COLONOSCOPY TO CECUM Diagnosis:       Colon cancer screening      (Colon cancer screening [Z12.11])    Surgeons: Tyra Britton DO Provider: Brett Ramirez MD    Anesthesia Type: MAC ASA Status: 2            Anesthesia Type: MAC    Vitals  Vitals Value Taken Time   /98 02/28/24 1132   Temp 36.4 °C (97.5 °F) 02/28/24 1110   Pulse 57 02/28/24 1132   Resp 16 02/28/24 1125   SpO2 99 % 02/28/24 1132   Vitals shown include unfiled device data.        Post Anesthesia Care and Evaluation    Level of consciousness: awake and alert  Pain management: adequate    Airway patency: patent  Anesthetic complications: No anesthetic complications  PONV Status: controlled  Cardiovascular status: blood pressure returned to baseline and acceptable  Respiratory status: acceptable  Hydration status: acceptable

## 2024-02-28 NOTE — OP NOTE
Colonoscopy Procedure Note      Pre-operative Diagnosis: History of colon polyps    Post-operative Diagnosis: Polyps of the right colon, transverse colon, sigmoid colon, and rectum    Procedure: Colonoscopy with polypectomy using hot snare and cold forceps    Surgeon: Tobi    Anesthetic: Brett Zuñiga MD    Estimated Blood Loss: Minimal    Complications: None    Indications: History of colon polyps    Findings/Treatments:   Right colon polyps x 2-removed hot snare  Transverse colon polyps x 2-removed cold forceps  Sigmoid colon polyp-removed with hot snare  Rectal polyp-removed with cold forceps       Scope Withdrawal Time:  6 minutes      Recommendations: Await pathology      Procedure Details     After discussing the benefits and risks of the procedure with the patient, not limited to but including:  Bleeding, infection, perforation, aspiration; informed consent was signed.  The patient was taken into the endoscopy room at [facility] and placed in the left lateral decubitus position.  MAC anesthesia was given with appropriate cardiopulmonary monitoring.  A rectal exam was performed.  Sphincter tone was normal.  The colonoscope was then inserted and carefully advanced to the cecum while visualizing the mucosa.  The cecum was identified by the ileocecal valve and the orifice of the appendix.  Once in the cecum the scope was withdrawn while removing polyps along the way.  The large polyps were removed with hot snare and the smaller ones with cold forceps.  After the rectal polyp was removed the scope was retroflexed straightened and withdrawn.  Angel was then taken to the recovery room in stable postoperative condition having tolerated his procedure well.    Tyra Britton DO

## 2024-02-28 NOTE — ANESTHESIA PREPROCEDURE EVALUATION
Anesthesia Evaluation     Patient summary reviewed and Nursing notes reviewed   NPO Solid Status: > 8 hours  NPO Liquid Status: > 2 hours           Airway   Mallampati: II  TM distance: >3 FB  Neck ROM: full  Dental      Pulmonary    (+) asthma,  Cardiovascular - negative cardio ROS        Neuro/Psych  (+) psychiatric history Depression  GI/Hepatic/Renal/Endo - negative ROS     Musculoskeletal (-) negative ROS    Abdominal    Substance History - negative use     OB/GYN          Other - negative ROS                         Anesthesia Plan    ASA 2     MAC     intravenous induction     Anesthetic plan, risks, benefits, and alternatives have been provided, discussed and informed consent has been obtained with: patient.        CODE STATUS:

## 2024-02-29 LAB
LAB AP CASE REPORT: NORMAL
LAB AP CLINICAL INFORMATION: NORMAL
PATH REPORT.FINAL DX SPEC: NORMAL
PATH REPORT.GROSS SPEC: NORMAL

## 2024-03-19 RX ORDER — ALBUTEROL SULFATE 90 UG/1
2 AEROSOL, METERED RESPIRATORY (INHALATION) EVERY 4 HOURS PRN
Qty: 72 G | Refills: 0 | Status: SHIPPED | OUTPATIENT
Start: 2024-03-19

## 2024-03-19 NOTE — TELEPHONE ENCOUNTER
Caller: Angel Chandra    Relationship: Self    Best call back number: [phone]    Requested Prescriptions:   Requested Prescriptions     Pending Prescriptions Disp Refills    albuterol sulfate  (90 Base) MCG/ACT inhaler 18 g 2     Sig: Inhale 2 puffs Every 4 (Four) Hours As Needed for Wheezing.        Pharmacy where request should be sent: [facility]/PHARMACY #[id number] - [place], [place] - [address] [phone] [facility] [phone] FX     Last office visit with prescribing clinician: 2023   Last telemedicine visit with prescribing clinician: Visit date not found   Next office visit with prescribing clinician: Visit date not found     Additional details provided by patient: PATIENT REQUESTING 4 INHALERS TO BE PRESCRIBED SO HE CAN HAVE THEM AT DIFFERENT LOCATIONS. THE PATIENT STATES HIS INHALERS HAVE .    Does the patient have less than a 3 day supply:  [x] Yes  [] No    Would you like a call back once the refill request has been completed: [] Yes [x] No    If the office needs to give you a call back, can they leave a voicemail: [] Yes [x] No    Prisca Roe   24 11:19 EDT

## 2024-03-20 ENCOUNTER — TELEPHONE (OUTPATIENT)
Dept: FAMILY MEDICINE CLINIC | Facility: CLINIC | Age: 44
End: 2024-03-20
Payer: COMMERCIAL

## 2024-05-22 DIAGNOSIS — F32.A DEPRESSION, UNSPECIFIED DEPRESSION TYPE: Chronic | ICD-10-CM

## 2024-05-22 DIAGNOSIS — J45.30 MILD PERSISTENT ASTHMA WITHOUT COMPLICATION: Chronic | ICD-10-CM

## 2024-05-22 RX ORDER — ESCITALOPRAM OXALATE 20 MG/1
TABLET ORAL
Qty: 90 TABLET | Refills: 0 | Status: SHIPPED | OUTPATIENT
Start: 2024-05-22

## 2024-05-22 RX ORDER — MONTELUKAST SODIUM 10 MG/1
TABLET ORAL
Qty: 90 TABLET | Refills: 0 | Status: SHIPPED | OUTPATIENT
Start: 2024-05-22

## 2024-06-26 DIAGNOSIS — J45.30 MILD PERSISTENT ASTHMA WITHOUT COMPLICATION: Chronic | ICD-10-CM

## 2024-06-28 RX ORDER — FLUTICASONE PROPIONATE AND SALMETEROL 250; 50 UG/1; UG/1
1 POWDER RESPIRATORY (INHALATION) 2 TIMES DAILY
Qty: 60 EACH | Refills: 5 | Status: SHIPPED | OUTPATIENT
Start: 2024-06-28

## 2024-08-21 DIAGNOSIS — F32.A DEPRESSION, UNSPECIFIED DEPRESSION TYPE: Chronic | ICD-10-CM

## 2024-08-22 RX ORDER — ESCITALOPRAM OXALATE 20 MG/1
TABLET ORAL
Qty: 30 TABLET | Refills: 0 | Status: SHIPPED | OUTPATIENT
Start: 2024-08-22

## 2024-08-25 DIAGNOSIS — J45.30 MILD PERSISTENT ASTHMA WITHOUT COMPLICATION: Chronic | ICD-10-CM

## 2024-08-26 RX ORDER — MONTELUKAST SODIUM 10 MG/1
TABLET ORAL
Qty: 30 TABLET | Refills: 0 | Status: SHIPPED | OUTPATIENT
Start: 2024-08-26

## 2024-09-22 DIAGNOSIS — F32.A DEPRESSION, UNSPECIFIED DEPRESSION TYPE: Chronic | ICD-10-CM

## 2024-09-22 DIAGNOSIS — J45.30 MILD PERSISTENT ASTHMA WITHOUT COMPLICATION: Chronic | ICD-10-CM

## 2024-09-23 RX ORDER — ESCITALOPRAM OXALATE 20 MG/1
TABLET ORAL
Qty: 30 TABLET | Refills: 1 | Status: SHIPPED | OUTPATIENT
Start: 2024-09-23

## 2024-09-23 RX ORDER — MONTELUKAST SODIUM 10 MG/1
TABLET ORAL
Qty: 30 TABLET | Refills: 1 | Status: SHIPPED | OUTPATIENT
Start: 2024-09-23

## 2024-10-15 ENCOUNTER — HOSPITAL ENCOUNTER (OUTPATIENT)
Dept: GENERAL RADIOLOGY | Facility: HOSPITAL | Age: 44
Discharge: HOME | End: 2024-10-15
Payer: COMMERCIAL

## 2024-10-15 ENCOUNTER — PRE-ADMISSION TESTING (OUTPATIENT)
Dept: PREADMISSION TESTING | Facility: HOSPITAL | Age: 44
End: 2024-10-15
Payer: COMMERCIAL

## 2024-10-15 VITALS
DIASTOLIC BLOOD PRESSURE: 87 MMHG | HEART RATE: 62 BPM | OXYGEN SATURATION: 97 % | RESPIRATION RATE: 16 BRPM | TEMPERATURE: 97.5 F | WEIGHT: 188.2 LBS | SYSTOLIC BLOOD PRESSURE: 125 MMHG | BODY MASS INDEX: 24.94 KG/M2 | HEIGHT: 73 IN

## 2024-10-15 LAB
ALBUMIN SERPL-MCNC: 4.1 G/DL (ref 3.5–5.2)
ALBUMIN/GLOB SERPL: 1.4 G/DL
ALP SERPL-CCNC: 62 U/L (ref 39–117)
ALT SERPL W P-5'-P-CCNC: 21 U/L (ref 1–41)
ANION GAP SERPL CALCULATED.3IONS-SCNC: 9.8 MMOL/L (ref 5–15)
AST SERPL-CCNC: 17 U/L (ref 1–40)
BASOPHILS # BLD AUTO: 0.06 10*3/MM3 (ref 0–0.2)
BASOPHILS NFR BLD AUTO: 1 % (ref 0–1.5)
BILIRUB SERPL-MCNC: 0.4 MG/DL (ref 0–1.2)
BILIRUB UR QL STRIP: NEGATIVE
BUN SERPL-MCNC: 11 MG/DL (ref 6–20)
BUN/CREAT SERPL: 8.9 (ref 7–25)
CALCIUM SPEC-SCNC: 9.2 MG/DL (ref 8.6–10.5)
CHLORIDE SERPL-SCNC: 103 MMOL/L (ref 98–107)
CLARITY UR: CLEAR
CO2 SERPL-SCNC: 26.2 MMOL/L (ref 22–29)
COLOR UR: YELLOW
CREAT SERPL-MCNC: 1.23 MG/DL (ref 0.76–1.27)
DEPRECATED RDW RBC AUTO: 41.5 FL (ref 37–54)
EGFRCR SERPLBLD CKD-EPI 2021: 74.2 ML/MIN/1.73
EOSINOPHIL # BLD AUTO: 0.13 10*3/MM3 (ref 0–0.4)
EOSINOPHIL NFR BLD AUTO: 2.1 % (ref 0.3–6.2)
ERYTHROCYTE [DISTWIDTH] IN BLOOD BY AUTOMATED COUNT: 12.5 % (ref 12.3–15.4)
GLOBULIN UR ELPH-MCNC: 3 GM/DL
GLUCOSE SERPL-MCNC: 103 MG/DL (ref 65–99)
GLUCOSE UR STRIP-MCNC: NEGATIVE MG/DL
HCT VFR BLD AUTO: 46.3 % (ref 37.5–51)
HGB BLD-MCNC: 15.4 G/DL (ref 13–17.7)
HGB UR QL STRIP.AUTO: NEGATIVE
IMM GRANULOCYTES # BLD AUTO: 0.02 10*3/MM3 (ref 0–0.05)
IMM GRANULOCYTES NFR BLD AUTO: 0.3 % (ref 0–0.5)
KETONES UR QL STRIP: NEGATIVE
LEUKOCYTE ESTERASE UR QL STRIP.AUTO: NEGATIVE
LYMPHOCYTES # BLD AUTO: 2.7 10*3/MM3 (ref 0.7–3.1)
LYMPHOCYTES NFR BLD AUTO: 43.8 % (ref 19.6–45.3)
MCH RBC QN AUTO: 29.8 PG (ref 26.6–33)
MCHC RBC AUTO-ENTMCNC: 33.3 G/DL (ref 31.5–35.7)
MCV RBC AUTO: 89.7 FL (ref 79–97)
MONOCYTES # BLD AUTO: 0.59 10*3/MM3 (ref 0.1–0.9)
MONOCYTES NFR BLD AUTO: 9.6 % (ref 5–12)
NEUTROPHILS NFR BLD AUTO: 2.67 10*3/MM3 (ref 1.7–7)
NEUTROPHILS NFR BLD AUTO: 43.2 % (ref 42.7–76)
NITRITE UR QL STRIP: NEGATIVE
NRBC BLD AUTO-RTO: 0 /100 WBC (ref 0–0.2)
PH UR STRIP.AUTO: 6.5 [PH] (ref 5–8)
PLATELET # BLD AUTO: 294 10*3/MM3 (ref 140–450)
PMV BLD AUTO: 9.4 FL (ref 6–12)
POTASSIUM SERPL-SCNC: 4.1 MMOL/L (ref 3.5–5.2)
PROT SERPL-MCNC: 7.1 G/DL (ref 6–8.5)
PROT UR QL STRIP: NEGATIVE
QT INTERVAL: 412 MS
QTC INTERVAL: 391 MS
RBC # BLD AUTO: 5.16 10*6/MM3 (ref 4.14–5.8)
SODIUM SERPL-SCNC: 139 MMOL/L (ref 136–145)
SP GR UR STRIP: 1.01 (ref 1–1.03)
UROBILINOGEN UR QL STRIP: NORMAL
WBC NRBC COR # BLD AUTO: 6.17 10*3/MM3 (ref 3.4–10.8)

## 2024-10-15 PROCEDURE — 93010 ELECTROCARDIOGRAM REPORT: CPT | Performed by: INTERNAL MEDICINE

## 2024-10-15 PROCEDURE — 80053 COMPREHEN METABOLIC PANEL: CPT

## 2024-10-15 PROCEDURE — 93005 ELECTROCARDIOGRAM TRACING: CPT

## 2024-10-15 PROCEDURE — 81003 URINALYSIS AUTO W/O SCOPE: CPT

## 2024-10-15 PROCEDURE — 36415 COLL VENOUS BLD VENIPUNCTURE: CPT

## 2024-10-15 PROCEDURE — 71046 X-RAY EXAM CHEST 2 VIEWS: CPT

## 2024-10-15 PROCEDURE — 85025 COMPLETE CBC W/AUTO DIFF WBC: CPT

## 2024-10-15 RX ORDER — FAMOTIDINE 40 MG/1
40 TABLET, FILM COATED ORAL DAILY
COMMUNITY

## 2024-10-15 NOTE — DISCHARGE INSTRUCTIONS
Take the following medications the morning of surgery with a small sip of water:    WIXELA  FAMOTIDINE  ESCITALOPRAM    If you are on prescription narcotic pain medication to control your pain you may also take that medication the morning of surgery.    General Instructions:  Do not eat or drink anything after midnight the night before surgery.  Infants may have breast milk up to four hours before surgery.  Infants drinking formula may drink formula up to six hours before surgery.   Patients who avoid smoking, chewing tobacco and alcohol for 4 weeks prior to surgery have a reduced risk of post-operative complications.  Quit smoking as many days before surgery as you can.  Do not smoke, use chewing tobacco or drink alcohol the day of surgery.   If applicable bring your C-PAP/ BI-PAP machine in with you to preop day of surgery.  Bring any papers given to you in the doctor’s office.  Wear clean comfortable clothes.  Do not wear contact lenses, false eyelashes or make-up.  Bring a case for your glasses.   Bring crutches or walker if applicable.  Remove all piercings.  Leave jewelry and any other valuables at home.  Hair extensions with metal clips must be removed prior to surgery.  The Pre-Admission Testing nurse will instruct you to bring medications if unable to obtain an accurate list in Pre-Admission Testing.        If you were given a blood bank ID arm band remember to bring it with you the day of surgery.    Preventing a Surgical Site Infection:  For 2 to 3 days before surgery, avoid shaving with a razor because the razor can irritate skin and make it easier to develop an infection.    Any areas of open skin can increase the risk of a post-operative wound infection by allowing bacteria to enter and travel throughout the body.  Notify your surgeon if you have any skin wounds / rashes even if it is not near the expected surgical site.  The area will need assessed to determine if surgery should be delayed until it is  healed.  The night prior to surgery shower using a fresh bar of anti-bacterial soap (such as Dial) and clean washcloth.  Sleep in a clean bed with clean clothing.  Do not allow pets to sleep with you.  Shower on the morning of surgery using a fresh bar of anti-bacterial soap (such as Dial) and clean washcloth.  Dry with a clean towel and dress in clean clothing.  Ask your surgeon if you will be receiving antibiotics prior to surgery.  Make sure you, your family, and all healthcare providers clean their hands with soap and water or an alcohol based hand  before caring for you or your wound.    Day of surgery:  Your arrival time is approximately two hours before your scheduled surgery time.  Please note if you have an early arrival time the surgery doors do not open before 5:00 AM.  Upon arrival, a Pre-op nurse and Anesthesiologist will review your health history, obtain vital signs, and answer questions you may have.  The only belongings needed at this time will be your home medications and if applicable your C-PAP/BI-PAP machine.  A Pre-op nurse will start an IV and you may receive medication in preparation for surgery, including something to help you relax.      Please be aware that surgery does come with discomfort.  We want to make every effort to control your discomfort so please discuss any uncontrolled symptoms with your nurse.   Your doctor will most likely have prescribed pain medications.      If you are going home after surgery you will receive individualized written care instructions before being discharged.  A responsible adult must drive you to and from the hospital on the day of your surgery and ideally stay with you through the night.  Discharge prescriptions can be filled by the hospital pharmacy during regular pharmacy hours.  If you are having surgery late in the day/evening your prescription may be e-prescribed to your pharmacy.  Please verify your pharmacy hours or chose a 24 hour  pharmacy to avoid not having access to your prescription because your pharmacy has closed for the day.    If you are staying overnight following surgery, you will be transported to your hospital room following the recovery period.  [facility] has all private rooms.    If you have any questions please call Pre-Admission Testing at [phone].  Deductibles and co-payments are collected on the day of service. Please be prepared to pay the required co-pay, deductible or deposit on the day of service as defined by your plan.    Call your surgeon immediately if you experience any of the following symptoms:  Sore Throat  Shortness of Breath or difficulty breathing  Cough  Chills  Body soreness or muscle pain  Headache  Fever  New loss of taste or smell  Do not arrive for your surgery ill.  Your procedure will need to be rescheduled to another time.  You will need to call your physician before the day of surgery to avoid any unnecessary exposure to hospital staff as well as other patients.

## 2024-10-21 PROBLEM — G56.22 CUBITAL TUNNEL SYNDROME ON LEFT: Status: ACTIVE | Noted: 2024-10-21

## 2024-10-21 PROBLEM — G56.02 CARPAL TUNNEL SYNDROME OF LEFT WRIST: Status: ACTIVE | Noted: 2024-10-21

## 2024-10-21 NOTE — DISCHARGE INSTRUCTIONS
Orthopaedic & Hand Surgery  Discharge Instructions  Dr. Babak Mitchell  [phone]    INCISION CARE  Wash your hands prior to dressing changes  The postoperative dressings should be taken off starting on postoperative day #4. New dry dressings can then be placed around the wound and held in place with an Ace wrap. Dressings should be changed daily.  No creams or ointments to the incision until 3 weeks post op.   It is okay to wash the incision with clean water (water you would drink) and soap but do not scrub. Do not soak your incision. After after showering or washing her hands, pat the wound dry gently and cover with new dry dressings.  Do not touch or pick at the incision  Check incision every day and notify surgeon immediately if any of the following signs or symptoms are seen:  Increase in redness  Increase in swelling around the incision and of the entire extremity  Increase in pain  NEW drainage or oozing from the incision  Pulling apart of the edges of the incision  Increase in overall body temperature (greater than 100.4°F)  Your surgeon will instruct you regarding suture or staple removal. In general, this happens at the 1-2-week post op appointment.    ACTIVITIES  Exercises:  Physical therapy may or may not be needed after surgery. Once some healing has occurred, it will be possible to start therapy if you wish. However, most patients get their function back fairly well after surgery without formal therapy.  Activities of Daily Living:   Showering may begin immediately if the wrist can be protected. The splint and incision cannot get wet after surgery.   No tub baths, hot tubs, or swimming pools for 4 weeks.  May shower and let water run over the incision once the sutures are removed if there is no drainage and the wound is well healing. A new dry dressing can be applied after showering.     Restrictions  Weight: Do not put weight on the wrist until instructed to do so. Your surgeon will discuss  with restrictions in terms of activities allowed. In general, anything more strenuous than holding a pen or piece of paper should be avoided, the other wrist should do the vast majority of the work until the soft tissues have healed enough that it is not painful, then it is ok to use the wrist more strenuously.   Driving: Many patients have questions about when it is safe to return to driving. The answer is that this is extremely variable. It depends on how quickly you heal. Until you can safely navigate the steering wheel, and are off of all narcotics, driving is not permitted. Your surgeon cannot “clear” you to return to driving, only you can make the decision when you feel it is safe.     Pain Control  Ice:  Ice is an excellent pain reliever. This can be used regardless of whether or not you are taking pain medication.  Apply an ice pack to the surgical area for 20 minutes at a time, removing it for at least an hour to prevent frostbite.  You should keep a towel between any dressings on the ice pack to prevent them from getting damp and from developing frostbite on the operative site.  Elevation:  Elevation is another easy way to control pain after surgery. Whenever possible, keep the operative limb elevated above the level of your heart to reduce swelling.  Acetaminophen (Tylenol):  CLASSIFICATION: A non-narcotic medication that is available without a prescription.  Acetaminophen controls pain but does not affect swelling or inflammation.  DRIVING: Acetaminophen will not impair your ability to drive. It is safe to drive while taking if your physical condition does not limit you.  POTENTIAL SIDE EFFECTS: nausea, stomach upset, liver failure if taken in large doses.  Interactions: Some narcotic medications contain acetaminophen. If you have a narcotic prescription, make sure to cut back on the acetaminophen if you are taking the narcotic. You should never take more 3000 mg of acetaminophen in one 24-hour  period.  DOSAGE:  Following surgery, you may take two regular strength (325 mg) tabs to control pain every 6 hours. This can be taken with NSAIDs (see below) or alternating the two.  After the initial surgical pain begins to resolve, you may begin to decrease the pain medication. By the end of a few weeks, you should be off of pain medications.  NSAIDS: This includes aspirin, ibuprofen, naproxen, Motrin, Aleve, Mobic, Celebrex  CLASSIFICATION: These are non-narcotic medications that are available without a prescription.  They are particularly effective at reducing swelling and inflammation  DRIVING: These medications will not impair your ability to drive. It is safe to drive while taking these medications if your physical condition does not limit you.  POTENTIAL SIDE EFFECTS: nausea, stomach upset, ulcer, gastric bleeding, kidney failure.  DOSAGE:  Following surgery, you may take ibuprofen (Motrin) 600 mg to control pain every 6 hours with food. It helps to take it scheduled (around the clock) to allow it to help reduce swelling.  After the initial surgical pain begins to resolve, you may begin to decrease the pain medication. By the end of a few weeks, you should be off of pain medications.    Medications  Anticoagulants: After upper extremity surgery most patients do not require an anticoagulant unless you have another injury that will be keeping you from mobilizing.  If you were already taking an anticoagulant (commonly Aspirin, Coumadin, or Plavix) you will likely be resuming your normal dose postoperatively and will be continuing that medication at the discretion of the prescribing physician.    FOLLOW-UP VISITS  You will need to follow up in the office with your surgeon in 1-2 weeks, or as instructed elsewhere in your discharge paperwork. Please call this number [phone] to schedule this appointment if one has not already been made.  If you have any concerns or suspected complications prior to your follow  up visit, please call the office. Do not wait until your appointment time if you suspect complications. These will need to be addressed in the office promptly.      Babak Mitchell MD, PhD  Orthopaedic Surgery  [facility]           What to expect after a Nerve Block    Nerve blocks administered to block pain affect many types of nerves, including those nerves that control movement, pain, and normal sensation. Following a nerve block, you may notice some bruising at the site where the block was given. You may experience sensations such as: numbness of the affected area or limb, tingling, heaviness (that is the limb feels heavy to you), weakness or inability to move the affected arm or leg, or a feeling as if your arm or leg has “fallen asleep.”     A nerve block can last from 2 to 36 hours depending on the medications used.  Usually the weakness wears off first followed by the tingling and heaviness. As the block wears off, you may begin to notice pain; however, this sequence of events may occur in any order. Typically, you will be able to move your limb before you will feel it. Once a nerve block begins to wear off, the effects are usually completely gone within 60 minutes.  If you experience continued side effects that you believe are block related for longer than 48 hours, please call your healthcare provider. Please see block-specific instructions below.    Instructions for any block involving the shoulder or arm  If you have had any kind of shoulder/arm block, you will go home with your arm in a sling. Wear the sling until the block has completely worn off. You may be required to wear it for a longer period of time per your surgeon’s recommendations.  If you have had a shoulder/arm block, it is a good idea to sleep on a recliner with pillows under your arm.    You may experience symptoms such as:  Shortness of breath  Hoarseness   Blurry vision  Unequal pupils  Drooping of your face on the  same side as the block was performed    These are side effects associated with this kind of block and should go away within 12 hours.    Note: If you have severe or prolonged shortness of breath, please seek medical assistance as soon as possible.     Protection of a “blocked” arm or leg (limb)  After a nerve block, you cannot feel pain, pressure, or extremes of temperature in the affected limb. And because of this, your blocked limb is at more risk for injury. For example, it is possible to burn your limb on an extremely hot surface without feeling it.     When resting, it is important to reposition your limb periodically to avoid prolonged pressure on it. This may require the use of pillows and padding.    While sleeping, you should avoid rolling onto the affected limb or putting too much pressure on it.     If you have a cast or tight dressing, check the color of your fingers or toes of the affected limb. Call your surgeon if they look discolored (that is, dusky, dark colored).    Use caution in cold weather. Cover your limb appropriately to protect it from the cold.      Pain Management:    Your surgeon will give you a prescription for pain medication. Begin taking this before the nerve block wears off. Bear in mind that sometimes the block can wear off in the middle of the night.

## 2024-10-21 NOTE — H&P
Orthopaedic & Hand Surgery  H&P Update  Dr. Babak Mitchell  [phone]    Name: Angel Chandra   : 1980     Date: 10/25/24   Time: 06:33 EDT    ------  This document is the preoperative History and Physical and is an extension of the last clinic note that is copied below.  ------    I have reviewed the patient's prior notes, interviewed and examined the patient on the day of surgery in the holding room.  The patient's condition has not changed, there are no new treatments available that obviate the need for surgery. The need for surgery still exists.  I have reviewed the procedure with the patient, answered any last-minute questions and have personally marked the operative site.    Physical exam this morning is unchanged from prior with the addition of:   CV: Regular rate and rhythm.    Respiratory: Non-labored breathing.     Medications:  No current facility-administered medications on file prior to encounter.     Current Outpatient Medications on File Prior to Encounter   Medication Sig Dispense Refill    busPIRone (BUSPAR) 5 MG tablet Take 1-3 tablets by mouth 2 (Two) Times a Day As Needed (Panic attacks). 30 tablet 0    escitalopram (LEXAPRO) 20 MG tablet TAKE 1 TABLET BY MOUTH EVERY DAY 30 tablet 1    loratadine (CLARITIN) 10 MG tablet Take 1 tablet by mouth Daily.      montelukast (SINGULAIR) 10 MG tablet TAKE 1 TABLET BY MOUTH EVERY DAY AT NIGHT (Patient taking differently: Take 1 tablet by mouth Daily.) 30 tablet 1    Wixela Inhub 250-50 MCG/ACT DISKUS TAKE 1 PUFF BY MOUTH TWICE A DAY (Patient taking differently: 1 puff Daily.) 60 each 5    albuterol sulfate  (90 Base) MCG/ACT inhaler Inhale 2 puffs Every 4 (Four) Hours As Needed for Wheezing. 72 g 0    baclofen (LIORESAL) 10 MG tablet TAKE 1 TABLET BY MOUTH THREE TIMES A DAY 30 tablet 1    EPINEPHrine (EPIPEN) 0.3 MG/0.3ML solution auto-injector injection Inject 0.3 mL under the skin into the appropriate area as directed 1 (One) Time As  Needed (For anaphylaxis with allergy shots that patient gets 1-2 times per week at Allergy Doctor's office).      [DISCONTINUED] gabapentin (NEURONTIN) 100 MG capsule Take 1 capsule by mouth 3 (Three) Times a Day for 60 days. 90 capsule 1       Allergies:  Allergies   Allergen Reactions    Prednisone Other (See Comments)     CONSTIPATION        Past Medical History:  Patient Active Problem List   Diagnosis    Asthma    Depression    Restless leg syndrome    Colon cancer screening    Carpal tunnel syndrome of left wrist    Cubital tunnel syndrome on left       Family History:  Family History   Problem Relation Age of Onset    No Known Problems Mother     Hypertension Father     Coronary artery disease Paternal Grandfather     Alzheimer's disease Paternal Grandfather     Malig Hyperthermia Neg Hx          Review of Systems - Negative except as stated in the HPI    The most recent clinic note (with his HPI and indications for surgery today) is pasted below:    Name RHONDA GRIFFITH (43yo, M) ID# [id number] Appt. Date/Time 2024 10:10AM   1980 Service Dept. IND LOC* [facility] *  Provider KRISTIAN VAZQUEZ MD  Insurance   Med [place]  Insurance # : [id number]  Policy/Group # : [id number]  Prescription: [facility] - Member is eligible. details  Chief Complaint  Left elbow pain  Patient's Pharmacies  [id number] ([facility]): [address] [id number], Ph [phone], Fax [phone]  Vitals  2024 10:56  Ht: 6 ft 1 in  Wt: 180 lbs  BMI: 23.7  Body Surface Area: 2.05 m²  Allergies  Reviewed Allergies  prednisone  Medications  Reviewed Medications  albuterol sulfate HFA 90 mcg/actuation aerosol inhaler  24   filled surescripts  EPINEPHrine 0.3 mg/0.3 mL injection, auto-injector  USE AS DIRECTED FOR ACUTE ALLERGIC REACTION  24   filled surescripts  escitalopram 20 mg tablet  TAKE 1 TABLET BY MOUTH EVERY DAY  24    filled surescripts  famotidine  02/15/24   entered Babak Mitchell MD  montelukast 10 mg tablet  TAKE 1 TABLET BY MOUTH EVERY DAY AT NIGHT  08/26/24   filled surescripts  Wixela Inhub 250 mcg-50 mcg/dose powder for inhalation  TAKE 1 PUFF BY MOUTH TWICE A DAY  05/27/24   filled surescripts  Vaccines  Reviewed Vaccines  pt unsure whether had flu shot 6/2024  Problems  Reviewed Problems  Numbness of hand - Onset: 02/18/2024  Tenosynovitis of left radial styloid - Onset: 02/18/2024  Pain of left wrist - Onset: 03/26/2024  Ulnar nerve entrapment at elbow - Onset: 06/24/2024, Left  Family History  Reviewed Family History  Paternal Grandmother - Family history of malignant neoplasm  Unspecified Relation - Family history of stroke  Maternal Grandfather - Family history of malignant neoplasm  Social History  Reviewed Social History  Public Health and Travel  Have you recently traveled abroad?: No  Have you been to an area known to be high risk for COVID-19?: No  In the 14 days before symptom onset, have you had close contact with a laboratory-confirmed COVID-19 while that case was ill?: No  In the 14 days before symptom onset, have you had close contact with a person who is under investigation for COVID-19 while that person was ill?: No  Substance Use  Do you or have you ever smoked tobacco?: Never smoker  Do you or have you ever used any other forms of tobacco or nicotine?: No  Do you or have you ever used e-cigarettes or vape?: Never used electronic cigarettes  Do you or have you ever used smokeless tobacco?: Never used smokeless tobacco  What was the date of your most recent tobacco screening?: 09/23/2024  What is your level of alcohol consumption?: Occasional  How many times per week do you consume alcohol?: Less than 1 time per week  Have you ever been counseled for unhealthy alcohol use?: No  Do you use any illicit or recreational drugs?: Yes  Which illicit or recreational drugs have you used?: Marijuana  How many  years have you used illicit or recreational drugs?: 3  Have you used IV drugs?: No  Advance Directive  Do you have an advance directive?: No  Do you have a medical power of ?: No  Surgical History  Reviewed Surgical History  Shoulder Surgery - 01/01/2012  Past Medical History  Reviewed Past Medical History  Asthma: Y  Brain Injury: Y  Depression: Y  HPI  Returns today for reevaluation of left hand pain and numbness. Notes that he still has some numbness in his hand. As previously discussed, he notes that at the time of his nerve studies, it was pointed out to him that he has a lack of cold sensitivity in the ring and small finger but not the remainder of the hand.    We reviewed that he is done nerve glide exercises with physical therapy for both median and ulnar nerves as well is worn wrist braces and elbow sleeves. Despite these modalities, he is still having persistent numbness that affects the index, long and small finger.    We reviewed that he had a previous striking below from a basketball approximately 2 years ago near the inner elbow. He had an initial numbness following that but then resolution.    Initial history obtained 2/15/2024:    44y/o right-hand-dominant male c/o left hand numbness and wrist pain    Onset: July 2023  Location: Left hand and wrist  Timing: Constant. Worse with use.  Quality: Aching pain at the radial aspect of the wrist. Weakness of . Numbness and paresthesias in the fingers, particularly the thumb and index.  Severity: 3/10 at best, 6/10 at worst  Modifying factors: He has trialed a cock up wrist brace during the day for the last 3 to 4 weeks without significant improvement. He has trialed ice, heat, taping and naproxen without change. He has undergone 15 sessions of hand therapy without change including nerve glide exercises. He is also undergone physical therapy for the cervical spine without change.  Context: Notes initial pain when he was playing basketball and a  student versus teacher match 2 years ago. He was hit on the left arm closer to the elbow hard as a student was passing and he had numbness that radiated on the outer aspect of the forearm towards the thumb for the next 3 hours. This resolved, however, he began to experience pain more distally at the wrist that hurt with holding his phone in the left hand or while wearing a watch while playing tennis. Given persistent symptoms despite conservative management as noted above, he was referred to me for further evaluation and management.    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 Hobbies include playing tennis.  ROS  ROS as noted in the HPI  Physical Exam  General:  The patient's general appearance was well-nourished, well-hydrated, no acute distress.  Orientation was alert and conversant    Musculoskeletal: Left hand and wrist  Inspection: Skin is intact, warm and well perfused.  Palpation: Not tender at the wrist or medial elbow today.  Sensation: More diminished in the median nerve distribution when compared to ulnar, he does not split the ring finger with regard to sensitivity. Intact light touch in radial nerve distribution. [place] [employer] monofilament testing reveals 0.07 g for sensitivity in all digits.  Motor: 5/5 strength in thenar and first dorsal interosseous muscles. Able to flex, extend and abduct the fingers. Able to abduct the thumb orthogonal to the plane of the palm.  Vascular: Brisk cap refill to all digits and 2+ radial pulse. Modified Joao's test is normal  Joint stability and range of motion: Able to make a composite fist and fully extend the fingers. Able to oppose the thumb to the distal palmar crease at the base of the small finger. Wrist range of motion is 70° extension and 81° of flexion. Elbow range of motion is °.  Other: Tinel's at the carpal tunnel is negative. Phalen's test is positive. Tinel's over the cubital tunnel is negative.  Elbow flexion test is negative.  Assessment / Plan  44-year-old right-hand-dominant male with:    1. Left hand numbness concerning for wrist carpal tunnel syndrome based on previous description but with electrodiagnostic studies to support cubital tunnel syndrome with slowing of nerve conduction but with reinnervation potentials in the first dorsal interosseous and abductor digiti minimi. We reviewed this today. For the cubital tunnel, he has trialed conservative management including elbow sleeve together with nerve glide exercises without improvement. He is still having numbness that affects both the index and long finger but also with loss of cold sensitivity in the ring and small. We discussed these findings today. No evidence for ulnar nerve subluxation at the elbow. Given persistent symptoms despite conservative management, I recommended additional treatment. Steroid injection is not indicated in the treatment algorithm for cubital tunnel. For this reason, I recommended cubital tunnel release with possible anterior transposition.    With regards to numbness in the index and long finger, I noted that this would normally reflect compression of the median nerve at the wrist. I noted that there are potentials for interconnections between the ulnar and median nerves and that this may be the cause for his numbness, however, given that he would already be undergoing surgery for cubital tunnel decompression, I noted that it would be reasonable to proceed with carpal tunnel release concomitantly to avoid the risk of a second surgery. Differential also includes cervical impingement, however, he denies any neck pain or radiating pain in the arm.    A thorough discussion of the risks, benefits and alternatives of this combined procedure of left carpal tunnel release and left cubital tunnel release with possible anterior transposition as well as the anticipated time course of recovery ensued. Following our discussion,  questions were solicited and answered to his satisfaction. He voiced an understanding of the nature of the condition, the indicated procedure, the risks and the alternatives. He requested surgery and verbal consent was obtained. In keeping with facility practice, written consent will be obtained on the day of procedure.    2. With respect to his de Quervain's, this is not currently noxious for him. Observation at this time.    1. 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 Numbness of hand -  Left side concerning for carpal tunnel syndrome  R20.0: Anesthesia of skin    XR, ELBOW, 3 OR MORE VIEW  Side: LEFT  Result:  - XRAY ELBOW 3+ VIEW: Performed  Result Note: Radiographs of the left elbow (4 views) were obtained today and reviewed by me. My impression is: No degenerative changes noted. No acute osseous abnormalities identified. Alignment is normal. No foreign body seen.  Patient Instructions  1. Plan for surgery as noted below:  - Diagnosis: Left hand numbness concerning for left carpal tunnel & cubital tunnel syndromes  - Proposed surgery: Left carpal tunnel release (CPT 86597) and cubital tunnel release with possible anterior transposition (CPT 59972)  - Special considerations/equipment: To be performed under MAC regional anesthesia with sterile tourniquet control.  - Risks of surgery: Pain, bleeding, infection, damage to nerves, blood vessels or other surrounding structures, incomplete release and/or recurrence of the condition, stiffness, scar, further procedures, unforeseen risks of surgery including stroke and death.  -Alternatives to surgery: No surgery, bracing, injections    2. He will need postoperative evaluation at 10 to 14 days for wound inspection and suture removal.    Return to Office  to see Babak Mitchell MD at [facility] * on or around 09/23/2024  Encounter Sign-Off  Encounter  signed-off by Babak Mitchell MD, 09/23/2024.  Encounter performed and documented by Babak Mitchell MD  Encounter reviewed & signed by Babak Mitchell MD on 09/23/2024 at 1:34pm    -----    I have evaluated the patient and determined that he is stable and cleared for surgery in the ambulatory setting.      Babak Mitchell MD, PhD  Orthopaedic & Hand Surgery  [facility]  [phone] - [place] [facility]  [phone] - [facility]

## 2024-10-24 ENCOUNTER — ANESTHESIA EVENT (OUTPATIENT)
Dept: PERIOP | Facility: HOSPITAL | Age: 44
End: 2024-10-24
Payer: COMMERCIAL

## 2024-10-25 ENCOUNTER — ANESTHESIA (OUTPATIENT)
Dept: PERIOP | Facility: HOSPITAL | Age: 44
End: 2024-10-25
Payer: COMMERCIAL

## 2024-10-25 ENCOUNTER — HOSPITAL ENCOUNTER (OUTPATIENT)
Facility: HOSPITAL | Age: 44
Setting detail: HOSPITAL OUTPATIENT SURGERY
Discharge: HOME | End: 2024-10-25
Attending: STUDENT IN AN ORGANIZED HEALTH CARE EDUCATION/TRAINING PROGRAM | Admitting: STUDENT IN AN ORGANIZED HEALTH CARE EDUCATION/TRAINING PROGRAM
Payer: COMMERCIAL

## 2024-10-25 VITALS
DIASTOLIC BLOOD PRESSURE: 82 MMHG | RESPIRATION RATE: 16 BRPM | HEART RATE: 64 BPM | OXYGEN SATURATION: 96 % | SYSTOLIC BLOOD PRESSURE: 119 MMHG | TEMPERATURE: 97.6 F

## 2024-10-25 DIAGNOSIS — G56.02 CARPAL TUNNEL SYNDROME OF LEFT WRIST: Primary | ICD-10-CM

## 2024-10-25 DIAGNOSIS — G56.22 CUBITAL TUNNEL SYNDROME ON LEFT: ICD-10-CM

## 2024-10-25 PROCEDURE — 25010000002 CEFAZOLIN PER 500 MG: Performed by: STUDENT IN AN ORGANIZED HEALTH CARE EDUCATION/TRAINING PROGRAM

## 2024-10-25 PROCEDURE — 25010000002 MEPIVACAINE HCL (PF) 1.5 % SOLUTION: Performed by: ANESTHESIOLOGY

## 2024-10-25 PROCEDURE — 25010000002 LIDOCAINE 1% - EPINEPHRINE 1:100000 1 %-1:100000 SOLUTION: Performed by: STUDENT IN AN ORGANIZED HEALTH CARE EDUCATION/TRAINING PROGRAM

## 2024-10-25 PROCEDURE — 25010000002 ROPIVACAINE PER 1 MG: Performed by: ANESTHESIOLOGY

## 2024-10-25 PROCEDURE — 25010000002 PROPOFOL 10 MG/ML EMULSION: Performed by: NURSE ANESTHETIST, CERTIFIED REGISTERED

## 2024-10-25 PROCEDURE — 25010000002 LIDOCAINE 2% SOLUTION: Performed by: NURSE ANESTHETIST, CERTIFIED REGISTERED

## 2024-10-25 PROCEDURE — 25010000002 FENTANYL CITRATE (PF) 50 MCG/ML SOLUTION: Performed by: ANESTHESIOLOGY

## 2024-10-25 PROCEDURE — 25010000002 MIDAZOLAM PER 1 MG: Performed by: ANESTHESIOLOGY

## 2024-10-25 PROCEDURE — 25810000003 LACTATED RINGERS PER 1000 ML: Performed by: ANESTHESIOLOGY

## 2024-10-25 RX ORDER — PROMETHAZINE HYDROCHLORIDE 25 MG/1
25 TABLET ORAL ONCE AS NEEDED
Status: DISCONTINUED | OUTPATIENT
Start: 2024-10-25 | End: 2024-10-28 | Stop reason: HOSPADM

## 2024-10-25 RX ORDER — FENTANYL CITRATE 50 UG/ML
100 INJECTION, SOLUTION INTRAMUSCULAR; INTRAVENOUS
Status: DISCONTINUED | OUTPATIENT
Start: 2024-10-25 | End: 2024-10-28 | Stop reason: HOSPADM

## 2024-10-25 RX ORDER — PROMETHAZINE HYDROCHLORIDE 25 MG/1
25 SUPPOSITORY RECTAL ONCE AS NEEDED
Status: DISCONTINUED | OUTPATIENT
Start: 2024-10-25 | End: 2024-10-28 | Stop reason: HOSPADM

## 2024-10-25 RX ORDER — HYDRALAZINE HYDROCHLORIDE 20 MG/ML
5 INJECTION INTRAMUSCULAR; INTRAVENOUS
Status: DISCONTINUED | OUTPATIENT
Start: 2024-10-25 | End: 2024-10-28 | Stop reason: HOSPADM

## 2024-10-25 RX ORDER — ROPIVACAINE HYDROCHLORIDE 5 MG/ML
INJECTION, SOLUTION EPIDURAL; INFILTRATION; PERINEURAL
Status: COMPLETED | OUTPATIENT
Start: 2024-10-25 | End: 2024-10-25

## 2024-10-25 RX ORDER — DROPERIDOL 2.5 MG/ML
0.62 INJECTION, SOLUTION INTRAMUSCULAR; INTRAVENOUS
Status: DISCONTINUED | OUTPATIENT
Start: 2024-10-25 | End: 2024-10-28 | Stop reason: HOSPADM

## 2024-10-25 RX ORDER — IPRATROPIUM BROMIDE AND ALBUTEROL SULFATE 2.5; .5 MG/3ML; MG/3ML
3 SOLUTION RESPIRATORY (INHALATION) ONCE AS NEEDED
Status: DISCONTINUED | OUTPATIENT
Start: 2024-10-25 | End: 2024-10-28 | Stop reason: HOSPADM

## 2024-10-25 RX ORDER — SODIUM CHLORIDE 0.9 % (FLUSH) 0.9 %
3 SYRINGE (ML) INJECTION EVERY 12 HOURS SCHEDULED
Status: DISCONTINUED | OUTPATIENT
Start: 2024-10-25 | End: 2024-10-28 | Stop reason: HOSPADM

## 2024-10-25 RX ORDER — FLUMAZENIL 0.1 MG/ML
0.2 INJECTION INTRAVENOUS AS NEEDED
Status: DISCONTINUED | OUTPATIENT
Start: 2024-10-25 | End: 2024-10-28 | Stop reason: HOSPADM

## 2024-10-25 RX ORDER — LABETALOL HYDROCHLORIDE 5 MG/ML
5 INJECTION, SOLUTION INTRAVENOUS
Status: DISCONTINUED | OUTPATIENT
Start: 2024-10-25 | End: 2024-10-28 | Stop reason: HOSPADM

## 2024-10-25 RX ORDER — MIDAZOLAM HYDROCHLORIDE 1 MG/ML
2 INJECTION, SOLUTION INTRAMUSCULAR; INTRAVENOUS
Status: DISCONTINUED | OUTPATIENT
Start: 2024-10-25 | End: 2024-10-28 | Stop reason: HOSPADM

## 2024-10-25 RX ORDER — LIDOCAINE HYDROCHLORIDE AND EPINEPHRINE 10; 10 MG/ML; UG/ML
INJECTION, SOLUTION INFILTRATION; PERINEURAL AS NEEDED
Status: DISCONTINUED | OUTPATIENT
Start: 2024-10-25 | End: 2024-10-25 | Stop reason: HOSPADM

## 2024-10-25 RX ORDER — DIPHENHYDRAMINE HYDROCHLORIDE 50 MG/ML
12.5 INJECTION INTRAMUSCULAR; INTRAVENOUS
Status: DISCONTINUED | OUTPATIENT
Start: 2024-10-25 | End: 2024-10-28 | Stop reason: HOSPADM

## 2024-10-25 RX ORDER — LIDOCAINE HYDROCHLORIDE 20 MG/ML
INJECTION, SOLUTION INFILTRATION; PERINEURAL AS NEEDED
Status: DISCONTINUED | OUTPATIENT
Start: 2024-10-25 | End: 2024-10-25 | Stop reason: SURG

## 2024-10-25 RX ORDER — OXYCODONE AND ACETAMINOPHEN 7.5; 325 MG/1; MG/1
1 TABLET ORAL EVERY 4 HOURS PRN
Status: DISCONTINUED | OUTPATIENT
Start: 2024-10-25 | End: 2024-10-28 | Stop reason: HOSPADM

## 2024-10-25 RX ORDER — EPINEPHRINE 0.3 MG/.3ML
0.3 INJECTION SUBCUTANEOUS ONCE AS NEEDED
COMMUNITY

## 2024-10-25 RX ORDER — DEXMEDETOMIDINE HYDROCHLORIDE 100 UG/ML
INJECTION, SOLUTION INTRAVENOUS AS NEEDED
Status: DISCONTINUED | OUTPATIENT
Start: 2024-10-25 | End: 2024-10-25 | Stop reason: SURG

## 2024-10-25 RX ORDER — ONDANSETRON 2 MG/ML
4 INJECTION INTRAMUSCULAR; INTRAVENOUS ONCE AS NEEDED
Status: DISCONTINUED | OUTPATIENT
Start: 2024-10-25 | End: 2024-10-28 | Stop reason: HOSPADM

## 2024-10-25 RX ORDER — PROPOFOL 10 MG/ML
VIAL (ML) INTRAVENOUS AS NEEDED
Status: DISCONTINUED | OUTPATIENT
Start: 2024-10-25 | End: 2024-10-25 | Stop reason: SURG

## 2024-10-25 RX ORDER — HYDROMORPHONE HYDROCHLORIDE 1 MG/ML
0.5 INJECTION, SOLUTION INTRAMUSCULAR; INTRAVENOUS; SUBCUTANEOUS
Status: DISCONTINUED | OUTPATIENT
Start: 2024-10-25 | End: 2024-10-28 | Stop reason: HOSPADM

## 2024-10-25 RX ORDER — FENTANYL CITRATE 50 UG/ML
50 INJECTION, SOLUTION INTRAMUSCULAR; INTRAVENOUS
Status: DISCONTINUED | OUTPATIENT
Start: 2024-10-25 | End: 2024-10-28 | Stop reason: HOSPADM

## 2024-10-25 RX ORDER — SODIUM CHLORIDE 0.9 % (FLUSH) 0.9 %
3-10 SYRINGE (ML) INJECTION AS NEEDED
Status: DISCONTINUED | OUTPATIENT
Start: 2024-10-25 | End: 2024-10-28 | Stop reason: HOSPADM

## 2024-10-25 RX ORDER — EPHEDRINE SULFATE 50 MG/ML
5 INJECTION, SOLUTION INTRAVENOUS ONCE AS NEEDED
Status: DISCONTINUED | OUTPATIENT
Start: 2024-10-25 | End: 2024-10-28 | Stop reason: HOSPADM

## 2024-10-25 RX ORDER — HYDROCODONE BITARTRATE AND ACETAMINOPHEN 5; 325 MG/1; MG/1
1 TABLET ORAL ONCE AS NEEDED
Status: DISCONTINUED | OUTPATIENT
Start: 2024-10-25 | End: 2024-10-28 | Stop reason: HOSPADM

## 2024-10-25 RX ORDER — SODIUM CHLORIDE, SODIUM LACTATE, POTASSIUM CHLORIDE, CALCIUM CHLORIDE 600; 310; 30; 20 MG/100ML; MG/100ML; MG/100ML; MG/100ML
9 INJECTION, SOLUTION INTRAVENOUS CONTINUOUS
Status: ACTIVE | OUTPATIENT
Start: 2024-10-25 | End: 2024-10-25

## 2024-10-25 RX ORDER — NALOXONE HCL 0.4 MG/ML
0.2 VIAL (ML) INJECTION AS NEEDED
Status: DISCONTINUED | OUTPATIENT
Start: 2024-10-25 | End: 2024-10-28 | Stop reason: HOSPADM

## 2024-10-25 RX ORDER — MAGNESIUM HYDROXIDE 1200 MG/15ML
LIQUID ORAL AS NEEDED
Status: DISCONTINUED | OUTPATIENT
Start: 2024-10-25 | End: 2024-10-25 | Stop reason: HOSPADM

## 2024-10-25 RX ORDER — TRAMADOL HYDROCHLORIDE 50 MG/1
50 TABLET ORAL EVERY 6 HOURS PRN
Qty: 8 TABLET | Refills: 0 | Status: SHIPPED | OUTPATIENT
Start: 2024-10-25

## 2024-10-25 RX ADMIN — ROPIVACAINE HYDROCHLORIDE 20 ML: 5 INJECTION EPIDURAL; INFILTRATION; PERINEURAL at 06:38

## 2024-10-25 RX ADMIN — PROPOFOL 60 MG: 10 INJECTION, EMULSION INTRAVENOUS at 07:06

## 2024-10-25 RX ADMIN — SODIUM CHLORIDE, POTASSIUM CHLORIDE, SODIUM LACTATE AND CALCIUM CHLORIDE 9 ML/HR: 600; 310; 30; 20 INJECTION, SOLUTION INTRAVENOUS at 06:26

## 2024-10-25 RX ADMIN — LIDOCAINE HYDROCHLORIDE 60 MG: 20 INJECTION, SOLUTION INFILTRATION; PERINEURAL at 07:06

## 2024-10-25 RX ADMIN — MEPIVACAINE HYDROCHLORIDE 10 ML: 15 INJECTION, SOLUTION EPIDURAL; INFILTRATION at 06:38

## 2024-10-25 RX ADMIN — DEXMEDETOMIDINE 10 MCG: 100 INJECTION, SOLUTION INTRAVENOUS at 07:03

## 2024-10-25 RX ADMIN — FENTANYL CITRATE 50 MCG: 50 INJECTION, SOLUTION INTRAMUSCULAR; INTRAVENOUS at 06:27

## 2024-10-25 RX ADMIN — PROPOFOL 140 MCG/KG/MIN: 10 INJECTION, EMULSION INTRAVENOUS at 07:07

## 2024-10-25 RX ADMIN — CEFAZOLIN 2 G: 2 INJECTION, POWDER, FOR SOLUTION INTRAMUSCULAR; INTRAVENOUS at 06:56

## 2024-10-25 RX ADMIN — MIDAZOLAM 2 MG: 1 INJECTION INTRAMUSCULAR; INTRAVENOUS at 06:28

## 2024-10-25 NOTE — OP NOTE
Orthopaedic & Hand Surgery  Ulnar Nerve Transposition Note  Dr. Babak Mitchell  [phone]    PATIENT NAME: Angel Chandra  MRN: [id number]  : 1980 AGE: 44 y.o. GENDER: male  DATE OF OPERATION: 10/25/2024  PREOPERATIVE DIAGNOSIS: Left Carpal Tunnel Syndrome & Cubital Tunnel Syndrome  POSTOPERATIVE DIAGNOSIS: Left Carpal Tunnel Syndrome & Cubital Tunnel Syndrome with ulnar nerve subluxation  OPERATION PERFORMED:   Left Carpal Tunnel Release (CPT 50500)  Left ulnar nerve cubital tunnel release with subcutaneous transposition (CPT 18902)  SURGEON: Babak Mitchell MD, PhD  ANESTHESIA: MAC regional and local  ASSISTANT: None  ESTIMATED BLOOD LOSS: <10 ml  SPONGE AND NEEDLE COUNT: Correct  INDICATIONS: This patient is noted to have carpal tunnel and cubital tunnel syndrome that failed conservative treatment consisting of extension elbow bracing. The risks of surgery were discussed and included Pain, Bleeding, Infection, Complications of anesthesia, Damage to blood vessels, nerves and other surrounding structures, Stiffness, Scar, incomplete release, incomplete resolution or recurrence of the condition, Further procedures, and Unforeseen risks of surgery including stroke, heart stoppage or death. No guarantees were made. Following a thorough discussion, questions were solicited and answered to his satisfaction. Verbal and written consent were obtained prior to proceeding with surgery.    SPECIMENS:   None    PERTINENT FINDINGS:   Subluxation of the ulnar nerve on flexion and extension of the elbow after release.  Presence of an anconeus epitrochlearis    TOURNIQUET TIME:   38 Minutes    DETAILS OF PROCEDURE:  The patient was met in the preoperative area. The site was marked. The consent and H&P were reviewed. The patient was then transferred to the operative suite.    The patient was positioned supine with the left arm extended on an arm board. After satisfactory induction of anesthesia, the hand and arm were  prepped and draped in normal sterile fashion which included alcohol, chlorhexidine and multiple layers of sterile draping.     A timeout was performed confirming the site and side of surgery, the antibiotic and allergy status and the presence of the necessary surgical equipment.     A sterile tourniquet was placed high on the arm, the extremity was then exsanguinated and the tourniquet was inflated.    Attention was then turned to the carpal tunnel release. A one inch incision was designed in the palm in line with the middle/ring web, making use of an existing crease.      The skin was incised and retracted and hemostasis was achieved. The palmar fascia was divided and the superficial palmar arch protected. The transverse fibers of the flexor retinaculum were then divided under direct vision from distal to proximal. The contents of the carpal tunnel were retracted radially and the proximal ligament and antebrachial fascia were divided, under direct vision to one and one half inches proximal to the wrist crease. This was confirmed visually and palpably with the edge of a freer elevator. Dissection was carried distally to a point visibly and palpably distal to the superficial palmar arch, completely opening the ligament and decompressing the median nerve. The nerve appeared compressed. It was dissected off of the overlying radial side of the flexor retinaculum, to which it was adherent. There were no anomalous branches of the median nerve or masses in the carpal tunnel. Subcutaneous tissues were then infiltrated with local anesthetic containing epinephrine for hemostasis purposes.    Attention was then turned to the cubital tunnel release. The skin was incised with a number 15 blade. The subcutaneous tissue was divided carefully.     The fascial layer, triceps tendon as well as the posterior aspect of the flexor pronator mass were identified and a medial full-thickness fasciocutaneous flap was raised uncovering the  medial epicondyle and the ulnar nerve behind the medial intermuscular septum. The nerve was then traced distally into the area of Cooper's ligament. The ligament appeared to be very stout and nerve appeared to be quite compressed in that area, also through a very prominent medial head of the triceps and an anomalous anconeus epitrochlearis.     The Cooper's ligament was now released and there appeared to be some narrowing of the nerve with some post-stenotic prominence of the nerve. The nerve was then traced out into the flexor pronator mass between the two heads of the FCU tendon about a distance of 7 to 9 cm. Fascial bands were divided carefully. After this, the nerve was traced proximally behind the medial intermuscular septum approximately 10 cm.      The nerve was then taken through range of motion, and appeared to have no adherences. There appeared to be no osteophytes or synovitic tissue from the elbow joint. There was a tendency for the nerve to subluxate with range of motion. We therefore proceeded with subcutaneous transposition.    The ulnar nerve was mobilized. Motor branch of the FCU was neurolysed off the ulnar nerve and the ulnar nerve was then transposed anteriorly over the fascia of the FCU. The strip of FCU fascia was then raised based proximally at the level of the medial epicondyle. A second flap that was distally based was also raised. This was at least 1 cm in width. This was used to create a sling around the ulnar nerve securing it well anterior to the medial epicondyle. It was then tacked in place using 4-0 Monocryl interrupted stitches. Care was taken to ensure that the route of the nerve was not kinked and that there were no sharp fascial edges either at the proximal or distal extent that were impinging on the nerve in either flexion or extension. In addition, we were able to pass a freer elevator under the sling of fascia together with the nerve, confirming that it was not constrictive.  We released the intermuscular septum underneath to ensure not create a hard edge for the nerve below. Were satisfied with her transposition, subcutaneous tissues were infiltrated with local anesthetic and the wound was then copiously irrigated.    The tourniquet was then deflated at 38 minutes. Hemostasis was obtained using a combination of direct pressure and bipolar electrocautery. The wound was again irrigated then closed in a layered fashion. Sterile dressing was applied and secured with an Ace bandage. Anesthesia was reversed with uneventful emergence, and the patient was taken to recovery in stable condition. The patient tolerated the procedure well with no immediate complications noted. The estimated blood loss was minimal. No specimens to pathology. Final sponge and instrument count were reported to me as correct.    POSTOPERATIVE PLAN:  The patient is to keep the hand elevated and use the hand lightly.  Remove dressings on postoperative day 4 and cover the wound with dry dressings  Okay to wait and cleanse the wound after 4 days with clean water and soap  Sutures to be removed on the first postoperative visit  Hand may be immersed in scar massage initiated 3 days after suture removal  Hand may be used gently and progressively for the next 4 weeks postoperative and then normal use and return to regular activities allowed. Anticipate some soreness in the medial elbow with pressure and extremes of range of motion for up to 4 months    Babak Mitchell MD, PhD  Orthopaedic & Hand Surgery  [facility]  [phone] - [place] [facility]  [phone] - [place] [facility]

## 2024-10-25 NOTE — ANESTHESIA PROCEDURE NOTES
Peripheral Block    Pre-sedation assessment completed: 10/25/2024 6:30 AM    Patient reassessed immediately prior to procedure    Patient location during procedure: pre-op  Start time: 10/25/2024 6:30 AM  Stop time: 10/25/2024 6:38 AM  Reason for block: at surgeon's request and post-op pain management  Performed by  Anesthesiologist: Yoni Oviedo MD  Preanesthetic Checklist  Completed: patient identified, IV checked, site marked, risks and benefits discussed, surgical consent, monitors and equipment checked, pre-op evaluation and timeout performed  Prep:  Pt Position: supine  Sterile barriers:cap, gloves, mask and sterile barriers  Prep: ChloraPrep  Patient monitoring: blood pressure monitoring, continuous pulse oximetry and EKG  Procedure    Sedation: yes  Performed under: local infiltration  Guidance:ultrasound guided and U/S used to identify structures for appropriate needle placement and to see local anesthetic disbursement    ULTRASOUND INTERPRETATION.  Using ultrasound guidance a 22 G gauge needle was placed in close proximity to the brachial plexus nerve, at which point, under ultrasound guidance anesthetic was injected in the area of the nerve and spread of the anesthesia was seen on ultrasound in close proximity thereto.  There were no abnormalities seen on ultrasound; a digital image was taken; and the patient tolerated the procedure with no complications. Images:still images obtained, printed/placed on chart (U/S used to localize the nerve)    Laterality:left  Block Type:interscalene  Injection Technique:single-shot  Needle Type:echogenic  Needle Gauge:22 G  Resistance on Injection: less than 15 psi    Medications Used: ropivacaine (NAROPIN) 0.5 % injection - Injection   20 mL - 10/25/2024 6:38:00 AM  Mepivacaine HCl (PF) (CARBOCAINE) 1.5 % injection - Injection   10 mL - 10/25/2024 6:38:00 AM      Post Assessment  Injection Assessment: negative aspiration for heme, no paresthesia on injection and  incremental injection  Patient Tolerance:comfortable throughout block  Complications:no  Performed by: Yoni Oviedo MD

## 2024-10-25 NOTE — ANESTHESIA POSTPROCEDURE EVALUATION
Patient: Angel Chandra    Procedure Summary       Date: 10/25/24 Room / Location:  [facility] [address] [place] [address] [facility]    Anesthesia Start: 0700 Anesthesia Stop: 0845    Procedure: LEFT CUBITAL TUNNEL RELEASE, ANTERIOR SUBCUNTANEOUS TRANSPOSITION AND LEFT CARPAL TUNNEL RELEASE (Left: Wrist) Diagnosis:     Surgeons: Babak Mitchell MD Provider: Titus George MD    Anesthesia Type: general with block ASA Status: 2            Anesthesia Type: general with block    Vitals  Vitals Value Taken Time   /83 10/25/24 0930   Temp 36.4 °C (97.6 °F) 10/25/24 0840   Pulse 64 10/25/24 0942   Resp 16 10/25/24 0930   SpO2 95 % 10/25/24 0942   Vitals shown include unfiled device data.        Anesthesia Post Evaluation

## 2024-10-25 NOTE — ANESTHESIA PREPROCEDURE EVALUATION
Anesthesia Evaluation     Patient summary reviewed and Nursing notes reviewed   NPO Solid Status: > 8 hours  NPO Liquid Status: > 2 hours           Airway   Mallampati: II  TM distance: >3 FB  Neck ROM: full  Dental      Pulmonary    (+) asthma,  Cardiovascular - negative cardio ROS        Neuro/Psych  (+) psychiatric history Depression  GI/Hepatic/Renal/Endo - negative ROS     Musculoskeletal (-) negative ROS    Abdominal    Substance History - negative use     OB/GYN          Other - negative ROS                     Anesthesia Plan    ASA 2     regional     intravenous induction     Anesthetic plan, risks, benefits, and alternatives have been provided, discussed and informed consent has been obtained with: patient.      CODE STATUS:

## 2024-11-24 DIAGNOSIS — J45.30 MILD PERSISTENT ASTHMA WITHOUT COMPLICATION: Chronic | ICD-10-CM

## 2024-11-24 DIAGNOSIS — F32.A DEPRESSION, UNSPECIFIED DEPRESSION TYPE: Chronic | ICD-10-CM

## 2024-11-25 RX ORDER — MONTELUKAST SODIUM 10 MG/1
TABLET ORAL
Qty: 90 TABLET | Refills: 0 | Status: SHIPPED | OUTPATIENT
Start: 2024-11-25

## 2024-11-25 RX ORDER — ESCITALOPRAM OXALATE 20 MG/1
TABLET ORAL
Qty: 90 TABLET | Refills: 0 | Status: SHIPPED | OUTPATIENT
Start: 2024-11-25

## 2024-11-25 NOTE — TELEPHONE ENCOUNTER
Rx Refill Note  Requested Prescriptions     Pending Prescriptions Disp Refills    escitalopram (LEXAPRO) 20 MG tablet [Pharmacy Med Name: ESCITALOPRAM 20 MG TABLET] 30 tablet 1     Sig: TAKE 1 TABLET BY MOUTH EVERY DAY    montelukast (SINGULAIR) 10 MG tablet [Pharmacy Med Name: MONTELUKAST SOD 10 MG TABLET] 30 tablet 1     Sig: TAKE 1 TABLET BY MOUTH EVERY DAY AT NIGHT      Last office visit with prescribing clinician: 8/24/2023   Last telemedicine visit with prescribing clinician: Visit date not found   Next office visit with prescribing clinician: 2/19/2025                         Would you like a call back once the refill request has been completed: [] Yes [] No    If the office needs to give you a call back, can they leave a voicemail: [] Yes [] No    Lucero Haider MA  11/25/24, 08:47 EST

## 2025-02-19 ENCOUNTER — OFFICE VISIT (OUTPATIENT)
Dept: FAMILY MEDICINE CLINIC | Facility: CLINIC | Age: 45
End: 2025-02-19
Payer: COMMERCIAL

## 2025-02-19 VITALS
DIASTOLIC BLOOD PRESSURE: 70 MMHG | WEIGHT: 195 LBS | OXYGEN SATURATION: 96 % | SYSTOLIC BLOOD PRESSURE: 110 MMHG | HEART RATE: 66 BPM | BODY MASS INDEX: 25.84 KG/M2 | HEIGHT: 73 IN

## 2025-02-19 DIAGNOSIS — Z00.00 ENCOUNTER FOR WELLNESS EXAMINATION IN ADULT: Primary | ICD-10-CM

## 2025-02-19 DIAGNOSIS — Z11.59 NEED FOR HEPATITIS C SCREENING TEST: ICD-10-CM

## 2025-02-19 DIAGNOSIS — J45.30 MILD PERSISTENT ASTHMA WITHOUT COMPLICATION: Chronic | ICD-10-CM

## 2025-02-19 DIAGNOSIS — F32.A DEPRESSION, UNSPECIFIED DEPRESSION TYPE: ICD-10-CM

## 2025-02-19 DIAGNOSIS — Z13.220 ENCOUNTER FOR LIPID SCREENING FOR CARDIOVASCULAR DISEASE: ICD-10-CM

## 2025-02-19 DIAGNOSIS — Z13.6 ENCOUNTER FOR LIPID SCREENING FOR CARDIOVASCULAR DISEASE: ICD-10-CM

## 2025-02-19 PROCEDURE — 99396 PREV VISIT EST AGE 40-64: CPT | Performed by: FAMILY MEDICINE

## 2025-02-19 RX ORDER — BUPROPION HYDROCHLORIDE 150 MG/1
150 TABLET ORAL DAILY
Qty: 90 TABLET | Refills: 3 | Status: SHIPPED | OUTPATIENT
Start: 2025-02-19

## 2025-02-19 RX ORDER — FLUTICASONE PROPIONATE AND SALMETEROL 250; 50 UG/1; UG/1
1 POWDER RESPIRATORY (INHALATION) 2 TIMES DAILY
Qty: 60 EACH | Refills: 5 | Status: SHIPPED | OUTPATIENT
Start: 2025-02-19

## 2025-02-19 RX ORDER — BUPROPION HYDROCHLORIDE 150 MG/1
300 TABLET ORAL DAILY
COMMUNITY
End: 2025-02-19 | Stop reason: SDUPTHER

## 2025-02-19 RX ORDER — FAMOTIDINE 20 MG/1
20 TABLET, FILM COATED ORAL DAILY
Status: SHIPPED | COMMUNITY
Start: 2025-02-19

## 2025-02-19 RX ORDER — ESCITALOPRAM OXALATE 20 MG/1
20 TABLET ORAL DAILY
Qty: 90 TABLET | Refills: 3 | Status: SHIPPED | OUTPATIENT
Start: 2025-02-19

## 2025-02-19 RX ORDER — MONTELUKAST SODIUM 10 MG/1
10 TABLET ORAL
Qty: 90 TABLET | Refills: 0 | Status: SHIPPED | OUTPATIENT
Start: 2025-02-19

## 2025-02-20 LAB
CHOLEST SERPL-MCNC: 301 MG/DL (ref 100–199)
HCV IGG SERPL QL IA: NON REACTIVE
HDLC SERPL-MCNC: 42 MG/DL
LDL CALC COMMENT:: ABNORMAL
LDLC SERPL CALC-MCNC: 216 MG/DL (ref 0–99)
TRIGL SERPL-MCNC: 217 MG/DL (ref 0–149)
VLDLC SERPL CALC-MCNC: 43 MG/DL (ref 5–40)

## 2025-05-25 DIAGNOSIS — J45.30 MILD PERSISTENT ASTHMA WITHOUT COMPLICATION: Chronic | ICD-10-CM

## 2025-05-27 RX ORDER — MONTELUKAST SODIUM 10 MG/1
10 TABLET ORAL
Qty: 90 TABLET | Refills: 0 | Status: SHIPPED | OUTPATIENT
Start: 2025-05-27

## 2025-05-27 NOTE — TELEPHONE ENCOUNTER
Rx Refill Note  Requested Prescriptions     Pending Prescriptions Disp Refills    montelukast (SINGULAIR) 10 MG tablet [Pharmacy Med Name: MONTELUKAST SOD 10 MG TABLET] 90 tablet 0     Sig: TAKE 1 TABLET BY MOUTH EVERYDAY AT BEDTIME      Last office visit with prescribing clinician: 2/19/2025   Last telemedicine visit with prescribing clinician: Visit date not found   Next office visit with prescribing clinician: 2/23/2026                         Would you like a call back once the refill request has been completed: [] Yes [] No    If the office needs to give you a call back, can they leave a voicemail: [] Yes [] No    Lucero Haider MA  05/27/25, 08:17 EDT

## 2025-06-14 ENCOUNTER — PATIENT MESSAGE (OUTPATIENT)
Dept: FAMILY MEDICINE CLINIC | Facility: CLINIC | Age: 45
End: 2025-06-14
Payer: COMMERCIAL

## (undated) DEVICE — STERILE TUBING EXTENSION, SINGLE, QUICK CONNECT

## (undated) DEVICE — LINER CANSTR SXN MEDIVAC FLX/ADV 1500ML

## (undated) DEVICE — WEBRIL COTTON UNDERCAST PADDING: Brand: WEBRIL

## (undated) DEVICE — ARM SLING: Brand: DEROYAL

## (undated) DEVICE — Device

## (undated) DEVICE — ADAPT CLN SCPE ENDO PORPOISE BX/50 DISP

## (undated) DEVICE — CANN O2 ETCO2 FITS ALL CONN CO2 SMPL A/ 7IN DISP LF

## (undated) DEVICE — VESSEL LOOPS X-RAY DETECTABLE: Brand: DEROYAL

## (undated) DEVICE — UNDRGLV SURG BIOGEL PUNCTUREINDICATION SZ7 PF STRL

## (undated) DEVICE — DRESSING,GAUZE,XEROFORM,CURAD,1"X8",ST: Brand: CURAD

## (undated) DEVICE — PK ORTHO MINOR TOWER 40

## (undated) DEVICE — INTENDED FOR TISSUE SEPARATION, AND OTHER PROCEDURES THAT REQUIRE A SHARP SURGICAL BLADE TO PUNCTURE OR CUT.: Brand: BARD-PARKER ® CARBON RIB-BACK BLADES

## (undated) DEVICE — SUT PROLN 4/0 PS2 18IN BLU

## (undated) DEVICE — GOWN ISOL W/THUMB UNIV BLU BX/15

## (undated) DEVICE — KT ORCA ORCAPOD DISP STRL

## (undated) DEVICE — GLV SURG BIOGEL LTX PF 7

## (undated) DEVICE — SPNG GZ WOVN 4X4IN 12PLY 10/BX STRL

## (undated) DEVICE — APPL CHLORAPREP HI/LITE 26ML ORNG

## (undated) DEVICE — DISPOSABLE BIPOLAR FORCEPS 4" (10.2CM) JEWELERS, STRAIGHT 0.4MM TIP AND 12 FT. (3.6M) CABLE: Brand: KIRWAN

## (undated) DEVICE — WEBRIL* CAST PADDING: Brand: DEROYAL

## (undated) DEVICE — BNDG,ELSTC,MATRIX,STRL,2"X5YD,LF,HOOK&LP: Brand: MEDLINE

## (undated) DEVICE — RUBBERBAND LF STRL PK/2

## (undated) DEVICE — DRAPE,U/ SHT,SPLIT,PLAS,STERIL: Brand: MEDLINE

## (undated) DEVICE — SOL IRR H2O BTL 1000ML STRL

## (undated) DEVICE — DRAPE,HAND,STERILE: Brand: MEDLINE

## (undated) DEVICE — BNDG,ELSTC,MATRIX,STRL,3"X5YD,LF,HOOK&LP: Brand: MEDLINE

## (undated) DEVICE — DISPOSABLE TOURNIQUET CUFF SINGLE BLADDER, SINGLE PORT AND QUICK CONNECT CONNECTOR: Brand: COLOR CUFF

## (undated) DEVICE — PADDING,UNDERCAST,COTTON, 3X4YD STERILE: Brand: MEDLINE

## (undated) DEVICE — SUT MNCRYL 3/0 RB1 27IN UD Y215H

## (undated) DEVICE — SOL ISO/ALC 70PCT 4OZ